# Patient Record
Sex: MALE | Race: WHITE | NOT HISPANIC OR LATINO | Employment: FULL TIME | URBAN - METROPOLITAN AREA
[De-identification: names, ages, dates, MRNs, and addresses within clinical notes are randomized per-mention and may not be internally consistent; named-entity substitution may affect disease eponyms.]

---

## 2023-04-11 DIAGNOSIS — E78.2 MIXED HYPERLIPIDEMIA: ICD-10-CM

## 2023-04-13 RX ORDER — ATORVASTATIN CALCIUM 40 MG/1
TABLET, FILM COATED ORAL
Qty: 90 TABLET | Refills: 1 | Status: SHIPPED | OUTPATIENT
Start: 2023-04-13 | End: 2023-10-05

## 2023-08-05 DIAGNOSIS — I10 ESSENTIAL (PRIMARY) HYPERTENSION: ICD-10-CM

## 2023-08-11 RX ORDER — LOSARTAN POTASSIUM 100 MG/1
100 TABLET ORAL DAILY
Qty: 90 TABLET | Refills: 1 | Status: SHIPPED | OUTPATIENT
Start: 2023-08-11 | End: 2024-02-06 | Stop reason: SDUPTHER

## 2023-10-05 DIAGNOSIS — E78.2 MIXED HYPERLIPIDEMIA: ICD-10-CM

## 2023-10-05 RX ORDER — ATORVASTATIN CALCIUM 40 MG/1
TABLET, FILM COATED ORAL
Qty: 90 TABLET | Refills: 1 | Status: SHIPPED | OUTPATIENT
Start: 2023-10-05 | End: 2024-03-26

## 2023-11-13 ASSESSMENT — PROMIS GLOBAL HEALTH SCALE
RATE_SOCIAL_SATISFACTION: VERY GOOD
CARRYOUT_SOCIAL_ACTIVITIES: VERY GOOD
RATE_PHYSICAL_HEALTH: VERY GOOD
RATE_MENTAL_HEALTH: VERY GOOD
RATE_GENERAL_HEALTH: VERY GOOD
RATE_AVERAGE_PAIN: 1
EMOTIONAL_PROBLEMS: RARELY
RATE_AVERAGE_FATIGUE: MILD
CARRYOUT_PHYSICAL_ACTIVITIES: COMPLETELY
RATE_QUALITY_OF_LIFE: VERY GOOD

## 2023-11-17 ENCOUNTER — OFFICE VISIT (OUTPATIENT)
Dept: PRIMARY CARE | Facility: CLINIC | Age: 58
End: 2023-11-17
Payer: COMMERCIAL

## 2023-11-17 ENCOUNTER — LAB (OUTPATIENT)
Dept: LAB | Facility: LAB | Age: 58
End: 2023-11-17
Payer: COMMERCIAL

## 2023-11-17 VITALS
TEMPERATURE: 96.8 F | DIASTOLIC BLOOD PRESSURE: 74 MMHG | HEART RATE: 52 BPM | WEIGHT: 191.6 LBS | BODY MASS INDEX: 26.82 KG/M2 | HEIGHT: 71 IN | SYSTOLIC BLOOD PRESSURE: 120 MMHG

## 2023-11-17 DIAGNOSIS — I10 BENIGN ESSENTIAL HYPERTENSION: ICD-10-CM

## 2023-11-17 DIAGNOSIS — Z00.00 HEALTH CARE MAINTENANCE: ICD-10-CM

## 2023-11-17 DIAGNOSIS — E78.2 MIXED HYPERLIPIDEMIA: Primary | ICD-10-CM

## 2023-11-17 PROBLEM — N52.9 INABILITY TO ATTAIN ERECTION: Status: ACTIVE | Noted: 2023-11-17

## 2023-11-17 LAB
25(OH)D3 SERPL-MCNC: 40 NG/ML (ref 30–100)
ALBUMIN SERPL BCP-MCNC: 4.5 G/DL (ref 3.4–5)
ALP SERPL-CCNC: 63 U/L (ref 33–120)
ALT SERPL W P-5'-P-CCNC: 16 U/L (ref 10–52)
ANION GAP SERPL CALC-SCNC: 13 MMOL/L (ref 10–20)
APPEARANCE UR: CLEAR
AST SERPL W P-5'-P-CCNC: 19 U/L (ref 9–39)
BASOPHILS # BLD AUTO: 0.03 X10*3/UL (ref 0–0.1)
BASOPHILS NFR BLD AUTO: 0.5 %
BILIRUB SERPL-MCNC: 2.1 MG/DL (ref 0–1.2)
BILIRUB UR STRIP.AUTO-MCNC: NEGATIVE MG/DL
BUN SERPL-MCNC: 19 MG/DL (ref 6–23)
CALCIUM SERPL-MCNC: 9.7 MG/DL (ref 8.6–10.6)
CHLORIDE SERPL-SCNC: 100 MMOL/L (ref 98–107)
CHOLEST SERPL-MCNC: 132 MG/DL (ref 0–199)
CHOLESTEROL/HDL RATIO: 3.2
CO2 SERPL-SCNC: 29 MMOL/L (ref 21–32)
COLOR UR: NORMAL
CREAT SERPL-MCNC: 1.17 MG/DL (ref 0.5–1.3)
CREAT UR-MCNC: 43 MG/DL (ref 20–370)
EOSINOPHIL # BLD AUTO: 0.31 X10*3/UL (ref 0–0.7)
EOSINOPHIL NFR BLD AUTO: 5.5 %
ERYTHROCYTE [DISTWIDTH] IN BLOOD BY AUTOMATED COUNT: 12.2 % (ref 11.5–14.5)
GFR SERPL CREATININE-BSD FRML MDRD: 73 ML/MIN/1.73M*2
GLUCOSE SERPL-MCNC: 91 MG/DL (ref 74–99)
GLUCOSE UR STRIP.AUTO-MCNC: NEGATIVE MG/DL
HCT VFR BLD AUTO: 42.9 % (ref 41–52)
HDLC SERPL-MCNC: 40.7 MG/DL
HGB BLD-MCNC: 14.3 G/DL (ref 13.5–17.5)
IMM GRANULOCYTES # BLD AUTO: 0.02 X10*3/UL (ref 0–0.7)
IMM GRANULOCYTES NFR BLD AUTO: 0.4 % (ref 0–0.9)
KETONES UR STRIP.AUTO-MCNC: NEGATIVE MG/DL
LDLC SERPL CALC-MCNC: 70 MG/DL
LEUKOCYTE ESTERASE UR QL STRIP.AUTO: NEGATIVE
LYMPHOCYTES # BLD AUTO: 1.27 X10*3/UL (ref 1.2–4.8)
LYMPHOCYTES NFR BLD AUTO: 22.5 %
MCH RBC QN AUTO: 29.4 PG (ref 26–34)
MCHC RBC AUTO-ENTMCNC: 33.3 G/DL (ref 32–36)
MCV RBC AUTO: 88 FL (ref 80–100)
MICROALBUMIN UR-MCNC: <7 MG/L
MICROALBUMIN/CREAT UR: NORMAL MG/G{CREAT}
MONOCYTES # BLD AUTO: 0.47 X10*3/UL (ref 0.1–1)
MONOCYTES NFR BLD AUTO: 8.3 %
NEUTROPHILS # BLD AUTO: 3.55 X10*3/UL (ref 1.2–7.7)
NEUTROPHILS NFR BLD AUTO: 62.8 %
NITRITE UR QL STRIP.AUTO: NEGATIVE
NON HDL CHOLESTEROL: 91 MG/DL (ref 0–149)
NRBC BLD-RTO: 0 /100 WBCS (ref 0–0)
PH UR STRIP.AUTO: 7 [PH]
PLATELET # BLD AUTO: 159 X10*3/UL (ref 150–450)
POTASSIUM SERPL-SCNC: 3.8 MMOL/L (ref 3.5–5.3)
PROT SERPL-MCNC: 6.5 G/DL (ref 6.4–8.2)
PROT UR STRIP.AUTO-MCNC: NEGATIVE MG/DL
PSA SERPL-MCNC: 2.31 NG/ML
RBC # BLD AUTO: 4.87 X10*6/UL (ref 4.5–5.9)
RBC # UR STRIP.AUTO: NEGATIVE /UL
SODIUM SERPL-SCNC: 138 MMOL/L (ref 136–145)
SP GR UR STRIP.AUTO: 1.01
TRIGL SERPL-MCNC: 105 MG/DL (ref 0–149)
TSH SERPL-ACNC: 3.76 MIU/L (ref 0.44–3.98)
URATE SERPL-MCNC: 6.1 MG/DL (ref 4–7.5)
UROBILINOGEN UR STRIP.AUTO-MCNC: <2 MG/DL
VLDL: 21 MG/DL (ref 0–40)
WBC # BLD AUTO: 5.7 X10*3/UL (ref 4.4–11.3)

## 2023-11-17 PROCEDURE — 84443 ASSAY THYROID STIM HORMONE: CPT

## 2023-11-17 PROCEDURE — 80061 LIPID PANEL: CPT

## 2023-11-17 PROCEDURE — 93000 ELECTROCARDIOGRAM COMPLETE: CPT | Performed by: INTERNAL MEDICINE

## 2023-11-17 PROCEDURE — 3078F DIAST BP <80 MM HG: CPT | Performed by: INTERNAL MEDICINE

## 2023-11-17 PROCEDURE — 3074F SYST BP LT 130 MM HG: CPT | Performed by: INTERNAL MEDICINE

## 2023-11-17 PROCEDURE — 80053 COMPREHEN METABOLIC PANEL: CPT

## 2023-11-17 PROCEDURE — 82570 ASSAY OF URINE CREATININE: CPT

## 2023-11-17 PROCEDURE — 82043 UR ALBUMIN QUANTITATIVE: CPT

## 2023-11-17 PROCEDURE — 1036F TOBACCO NON-USER: CPT | Performed by: INTERNAL MEDICINE

## 2023-11-17 PROCEDURE — 36415 COLL VENOUS BLD VENIPUNCTURE: CPT

## 2023-11-17 PROCEDURE — 99396 PREV VISIT EST AGE 40-64: CPT | Performed by: INTERNAL MEDICINE

## 2023-11-17 PROCEDURE — 84550 ASSAY OF BLOOD/URIC ACID: CPT

## 2023-11-17 PROCEDURE — 85025 COMPLETE CBC W/AUTO DIFF WBC: CPT

## 2023-11-17 PROCEDURE — 81003 URINALYSIS AUTO W/O SCOPE: CPT

## 2023-11-17 PROCEDURE — 82306 VITAMIN D 25 HYDROXY: CPT

## 2023-11-17 PROCEDURE — 84153 ASSAY OF PSA TOTAL: CPT

## 2023-11-17 RX ORDER — HYDROCHLOROTHIAZIDE 12.5 MG/1
12.5 TABLET ORAL DAILY
COMMUNITY
End: 2024-02-02

## 2023-11-17 SDOH — HEALTH STABILITY: PHYSICAL HEALTH: ON AVERAGE, HOW MANY MINUTES DO YOU ENGAGE IN EXERCISE AT THIS LEVEL?: 30 MIN

## 2023-11-17 SDOH — HEALTH STABILITY: PHYSICAL HEALTH: ON AVERAGE, HOW MANY DAYS PER WEEK DO YOU ENGAGE IN MODERATE TO STRENUOUS EXERCISE (LIKE A BRISK WALK)?: 6 DAYS

## 2023-11-17 ASSESSMENT — ENCOUNTER SYMPTOMS
EYE DISCHARGE: 0
SPEECH DIFFICULTY: 0
HEMATURIA: 0
ACTIVITY CHANGE: 0
ANAL BLEEDING: 0
DIFFICULTY URINATING: 0
DYSURIA: 0
LIGHT-HEADEDNESS: 0
SINUS PAIN: 0
COLOR CHANGE: 0
FLANK PAIN: 0
RHINORRHEA: 0
NECK STIFFNESS: 0
DIZZINESS: 0
PALPITATIONS: 0
DIARRHEA: 0
FATIGUE: 0
CHEST TIGHTNESS: 0
NUMBNESS: 0
WOUND: 0
POLYDIPSIA: 0
STRIDOR: 0
SHORTNESS OF BREATH: 0
BACK PAIN: 1
CHOKING: 0
JOINT SWELLING: 0
POLYPHAGIA: 0
ADENOPATHY: 0
PHOTOPHOBIA: 0
BRUISES/BLEEDS EASILY: 0
ABDOMINAL PAIN: 0
SEIZURES: 0
CHILLS: 0
EYE PAIN: 0
ARTHRALGIAS: 0
MYALGIAS: 0
VOICE CHANGE: 0
SINUS PRESSURE: 0
COUGH: 0
SORE THROAT: 0
BLOOD IN STOOL: 0
WEAKNESS: 0
NAUSEA: 0
HEADACHES: 0
WHEEZING: 0
CONSTIPATION: 0
EYE ITCHING: 0
ABDOMINAL DISTENTION: 0
VOMITING: 0
RECTAL PAIN: 0
SLEEP DISTURBANCE: 0
TREMORS: 0
FREQUENCY: 0
NECK PAIN: 0
FACIAL ASYMMETRY: 0
EYE REDNESS: 0
DIAPHORESIS: 0
TROUBLE SWALLOWING: 0
APPETITE CHANGE: 0
FACIAL SWELLING: 0

## 2023-11-17 ASSESSMENT — LIFESTYLE VARIABLES
HOW MANY STANDARD DRINKS CONTAINING ALCOHOL DO YOU HAVE ON A TYPICAL DAY: 1 OR 2
AUDIT-C TOTAL SCORE: 4
SKIP TO QUESTIONS 9-10: 1
HOW OFTEN DO YOU HAVE A DRINK CONTAINING ALCOHOL: 4 OR MORE TIMES A WEEK
HOW OFTEN DO YOU HAVE SIX OR MORE DRINKS ON ONE OCCASION: NEVER

## 2023-11-17 ASSESSMENT — PATIENT HEALTH QUESTIONNAIRE - PHQ9
2. FEELING DOWN, DEPRESSED OR HOPELESS: NOT AT ALL
1. LITTLE INTEREST OR PLEASURE IN DOING THINGS: NOT AT ALL
SUM OF ALL RESPONSES TO PHQ9 QUESTIONS 1 & 2: 0

## 2023-11-17 NOTE — PATIENT INSTRUCTIONS
Please take medication as prescribed.  Diet and exercise.  Follow-up in 6 months..  Schedule your cardiac score

## 2023-11-17 NOTE — PROGRESS NOTES
Subjective   Patient ID: Saul Ramirez is a 57 y.o. male who presents for Annual Exam (Pt present today for physical. ls).    Patient presents for physical exam.  He has been compliant with his medications, diet and exercise.  He overall feels well.  He denies any headaches, no dizziness, no sinus problems, no chest pain or shortness of breath.  Denies abdominal pain no nausea vomiting or diarrhea.  He reports no new musculoskeletal complaints.  Does report baseline occasional back pain.         Review of Systems   Constitutional:  Negative for activity change, appetite change, chills, diaphoresis and fatigue.   HENT:  Negative for congestion, dental problem, drooling, ear discharge, ear pain, facial swelling, hearing loss, mouth sores, nosebleeds, postnasal drip, rhinorrhea, sinus pressure, sinus pain, sneezing, sore throat, tinnitus, trouble swallowing and voice change.    Eyes:  Negative for photophobia, pain, discharge, redness, itching and visual disturbance.   Respiratory:  Negative for cough, choking, chest tightness, shortness of breath, wheezing and stridor.    Cardiovascular:  Negative for chest pain, palpitations and leg swelling.   Gastrointestinal:  Negative for abdominal distention, abdominal pain, anal bleeding, blood in stool, constipation, diarrhea, nausea, rectal pain and vomiting.   Endocrine: Negative for cold intolerance, heat intolerance, polydipsia, polyphagia and polyuria.   Genitourinary:  Negative for decreased urine volume, difficulty urinating, dysuria, enuresis, flank pain, frequency, genital sores, hematuria and urgency.   Musculoskeletal:  Positive for back pain. Negative for arthralgias, gait problem, joint swelling, myalgias, neck pain and neck stiffness.   Skin:  Negative for color change, pallor, rash and wound.   Neurological:  Negative for dizziness, tremors, seizures, syncope, facial asymmetry, speech difficulty, weakness, light-headedness, numbness and headaches.  "  Hematological:  Negative for adenopathy. Does not bruise/bleed easily.   Psychiatric/Behavioral:  Negative for sleep disturbance.        Objective   /74   Pulse 52   Temp 36 °C (96.8 °F)   Ht 1.803 m (5' 11\")   Wt 86.9 kg (191 lb 9.6 oz)   BMI 26.72 kg/m²     Physical Exam  Constitutional:       General: He is not in acute distress.     Appearance: Normal appearance. He is normal weight. He is not ill-appearing, toxic-appearing or diaphoretic.   HENT:      Head: Normocephalic and atraumatic.      Right Ear: Tympanic membrane, ear canal and external ear normal. There is no impacted cerumen.      Left Ear: Tympanic membrane, ear canal and external ear normal. There is no impacted cerumen.      Nose: Nose normal. No congestion or rhinorrhea.      Mouth/Throat:      Mouth: Mucous membranes are moist.      Pharynx: Oropharynx is clear. No oropharyngeal exudate or posterior oropharyngeal erythema.   Eyes:      General: No scleral icterus.        Right eye: No discharge.         Left eye: No discharge.      Extraocular Movements: Extraocular movements intact.      Conjunctiva/sclera: Conjunctivae normal.      Pupils: Pupils are equal, round, and reactive to light.   Neck:      Vascular: No carotid bruit.   Cardiovascular:      Rate and Rhythm: Regular rhythm. Bradycardia present.      Pulses: Normal pulses.      Heart sounds: Normal heart sounds. No murmur heard.     No friction rub. No gallop.   Pulmonary:      Effort: No respiratory distress.      Breath sounds: No stridor. No wheezing, rhonchi or rales.   Chest:      Chest wall: No tenderness.   Abdominal:      General: Abdomen is flat. There is no distension.      Palpations: Abdomen is soft. There is no mass.      Tenderness: There is no abdominal tenderness. There is no right CVA tenderness, left CVA tenderness or guarding.      Hernia: No hernia is present.   Genitourinary:     Penis: Normal.       Testes: Normal.      Rectum: Normal.      Comments: " Smooth mildly enlarged prostate  Musculoskeletal:         General: No swelling, tenderness, deformity or signs of injury. Normal range of motion.      Cervical back: Normal range of motion and neck supple. No rigidity or tenderness.      Right lower leg: No edema.      Left lower leg: No edema.   Lymphadenopathy:      Cervical: No cervical adenopathy.   Skin:     General: Skin is warm and dry.      Coloration: Skin is not jaundiced or pale.      Findings: No bruising, erythema, lesion or rash.   Neurological:      General: No focal deficit present.      Mental Status: He is alert and oriented to person, place, and time. Mental status is at baseline.      Cranial Nerves: No cranial nerve deficit.      Sensory: No sensory deficit.      Motor: No weakness.      Coordination: Coordination normal.      Gait: Gait normal.      Deep Tendon Reflexes: Reflexes normal.   Psychiatric:         Mood and Affect: Mood normal.         Behavior: Behavior normal.         Thought Content: Thought content normal.         Judgment: Judgment normal.         Assessment/Plan   Diagnoses and all orders for this visit:  Mixed hyperlipidemia-we will check a fasting lipid profile.  -     CT cardiac scoring wo IV contrast; Future  Health care maintenance-colonoscopy has been done.  Immunizations are up-to-date.  Dermatology has been done.  Eye and dental have been done.  -     Albumin , Urine Random; Future  -     Vitamin D 25-Hydroxy,Total (for eval of Vitamin D levels); Future  -     CBC and Auto Differential; Future  -     Comprehensive Metabolic Panel; Future  -     Prostate Specific Antigen; Future  -     TSH with reflex to Free T4 if abnormal; Future  -     Uric Acid; Future  -     Urinalysis with Reflex Microscopic; Future  -     Lipid Panel; Future  -     ECG 12 Lead  Benign essential hypertension-low-salt diet and exercise.  Elevated calcium score-we will recheck cardiac score.  Modify risk factor

## 2023-11-18 DIAGNOSIS — R17 HIGH BILIRUBIN: ICD-10-CM

## 2023-11-18 DIAGNOSIS — Z00.00 HEALTH CARE MAINTENANCE: Primary | ICD-10-CM

## 2023-11-29 ENCOUNTER — ANCILLARY PROCEDURE (OUTPATIENT)
Dept: RADIOLOGY | Facility: CLINIC | Age: 58
End: 2023-11-29
Payer: COMMERCIAL

## 2023-11-29 DIAGNOSIS — R17 HIGH BILIRUBIN: ICD-10-CM

## 2023-11-29 PROCEDURE — 76705 ECHO EXAM OF ABDOMEN: CPT

## 2023-11-29 PROCEDURE — 76705 ECHO EXAM OF ABDOMEN: CPT | Performed by: STUDENT IN AN ORGANIZED HEALTH CARE EDUCATION/TRAINING PROGRAM

## 2023-12-07 DIAGNOSIS — F52.21 MALE ERECTILE DISORDER (CODE): ICD-10-CM

## 2023-12-07 RX ORDER — SILDENAFIL 100 MG/1
TABLET, FILM COATED ORAL
Qty: 6 TABLET | Refills: 11 | Status: SHIPPED | OUTPATIENT
Start: 2023-12-07

## 2024-02-01 DIAGNOSIS — I10 ESSENTIAL (PRIMARY) HYPERTENSION: ICD-10-CM

## 2024-02-02 RX ORDER — HYDROCHLOROTHIAZIDE 12.5 MG/1
12.5 TABLET ORAL DAILY
Qty: 90 TABLET | Refills: 1 | Status: SHIPPED | OUTPATIENT
Start: 2024-02-02

## 2024-02-05 DIAGNOSIS — I10 ESSENTIAL (PRIMARY) HYPERTENSION: ICD-10-CM

## 2024-02-06 RX ORDER — LOSARTAN POTASSIUM 100 MG/1
100 TABLET ORAL DAILY
Qty: 90 TABLET | Refills: 1 | Status: SHIPPED | OUTPATIENT
Start: 2024-02-06

## 2024-02-15 ENCOUNTER — LAB (OUTPATIENT)
Dept: LAB | Facility: LAB | Age: 59
End: 2024-02-15
Payer: COMMERCIAL

## 2024-02-15 DIAGNOSIS — Z00.00 HEALTH CARE MAINTENANCE: Primary | ICD-10-CM

## 2024-02-15 DIAGNOSIS — Z00.00 HEALTH CARE MAINTENANCE: ICD-10-CM

## 2024-02-15 LAB — PSA SERPL-MCNC: 2.61 NG/ML

## 2024-02-15 PROCEDURE — 84153 ASSAY OF PSA TOTAL: CPT

## 2024-02-15 PROCEDURE — 36415 COLL VENOUS BLD VENIPUNCTURE: CPT

## 2024-02-27 ENCOUNTER — HOSPITAL ENCOUNTER (OUTPATIENT)
Dept: RADIOLOGY | Facility: CLINIC | Age: 59
Discharge: HOME | End: 2024-02-27
Payer: COMMERCIAL

## 2024-02-27 DIAGNOSIS — E78.2 MIXED HYPERLIPIDEMIA: ICD-10-CM

## 2024-02-27 DIAGNOSIS — R93.1 AGATSTON CORONARY ARTERY CALCIUM SCORE GREATER THAN 400: Primary | ICD-10-CM

## 2024-02-27 PROCEDURE — 75571 CT HRT W/O DYE W/CA TEST: CPT

## 2024-03-15 ENCOUNTER — HOSPITAL ENCOUNTER (OUTPATIENT)
Dept: CARDIOLOGY | Facility: HOSPITAL | Age: 59
Discharge: HOME | End: 2024-03-15
Payer: COMMERCIAL

## 2024-03-15 DIAGNOSIS — R93.1 AGATSTON CORONARY ARTERY CALCIUM SCORE GREATER THAN 400: ICD-10-CM

## 2024-03-15 PROCEDURE — 93325 DOPPLER ECHO COLOR FLOW MAPG: CPT | Performed by: INTERNAL MEDICINE

## 2024-03-15 PROCEDURE — 93325 DOPPLER ECHO COLOR FLOW MAPG: CPT

## 2024-03-15 PROCEDURE — 93350 STRESS TTE ONLY: CPT | Performed by: INTERNAL MEDICINE

## 2024-03-15 PROCEDURE — 93321 DOPPLER ECHO F-UP/LMTD STD: CPT | Performed by: INTERNAL MEDICINE

## 2024-03-15 PROCEDURE — 93016 CV STRESS TEST SUPVJ ONLY: CPT | Performed by: INTERNAL MEDICINE

## 2024-03-15 PROCEDURE — 93018 CV STRESS TEST I&R ONLY: CPT | Performed by: INTERNAL MEDICINE

## 2024-03-26 DIAGNOSIS — E78.2 MIXED HYPERLIPIDEMIA: ICD-10-CM

## 2024-03-26 RX ORDER — ATORVASTATIN CALCIUM 40 MG/1
TABLET, FILM COATED ORAL
Qty: 90 TABLET | Refills: 1 | Status: SHIPPED | OUTPATIENT
Start: 2024-03-26

## 2024-03-29 PROBLEM — R97.20 PSA ELEVATION: Status: ACTIVE | Noted: 2024-03-29

## 2024-03-29 PROBLEM — Z85.828 HISTORY OF SQUAMOUS CELL CARCINOMA OF SKIN: Status: ACTIVE | Noted: 2024-03-29

## 2024-04-01 ENCOUNTER — OFFICE VISIT (OUTPATIENT)
Dept: CARDIOLOGY | Facility: CLINIC | Age: 59
End: 2024-04-01
Payer: COMMERCIAL

## 2024-04-01 VITALS
HEIGHT: 71 IN | BODY MASS INDEX: 26.95 KG/M2 | SYSTOLIC BLOOD PRESSURE: 101 MMHG | WEIGHT: 192.5 LBS | OXYGEN SATURATION: 98 % | HEART RATE: 68 BPM | DIASTOLIC BLOOD PRESSURE: 65 MMHG

## 2024-04-01 DIAGNOSIS — R93.1 AGATSTON CORONARY ARTERY CALCIUM SCORE GREATER THAN 400: ICD-10-CM

## 2024-04-01 DIAGNOSIS — E78.49 OTHER HYPERLIPIDEMIA: ICD-10-CM

## 2024-04-01 DIAGNOSIS — I25.84 CORONARY ARTERY CALCIFICATION: Primary | ICD-10-CM

## 2024-04-01 DIAGNOSIS — I25.10 CORONARY ARTERY CALCIFICATION: Primary | ICD-10-CM

## 2024-04-01 DIAGNOSIS — I10 BENIGN ESSENTIAL HYPERTENSION: ICD-10-CM

## 2024-04-01 PROCEDURE — 99203 OFFICE O/P NEW LOW 30 MIN: CPT | Performed by: INTERNAL MEDICINE

## 2024-04-01 PROCEDURE — 99213 OFFICE O/P EST LOW 20 MIN: CPT | Performed by: INTERNAL MEDICINE

## 2024-04-01 PROCEDURE — 1036F TOBACCO NON-USER: CPT | Performed by: INTERNAL MEDICINE

## 2024-04-01 PROCEDURE — 3078F DIAST BP <80 MM HG: CPT | Performed by: INTERNAL MEDICINE

## 2024-04-01 PROCEDURE — 3074F SYST BP LT 130 MM HG: CPT | Performed by: INTERNAL MEDICINE

## 2024-04-01 RX ORDER — ASPIRIN 81 MG/1
81 TABLET ORAL DAILY
COMMUNITY

## 2024-04-01 ASSESSMENT — ENCOUNTER SYMPTOMS
ALTERED MENTAL STATUS: 0
COUGH: 0
FALLS: 0
DYSURIA: 0
CHILLS: 0
HEADACHES: 0
FEVER: 0
LOSS OF SENSATION IN FEET: 0
CONSTIPATION: 0
NAUSEA: 0
ABDOMINAL PAIN: 0
MYALGIAS: 0
VOMITING: 0
BLOATING: 0
OCCASIONAL FEELINGS OF UNSTEADINESS: 0
WHEEZING: 0
HEMATURIA: 0
DEPRESSION: 0
DIARRHEA: 0
MEMORY LOSS: 0
HEMOPTYSIS: 0

## 2024-04-01 ASSESSMENT — PATIENT HEALTH QUESTIONNAIRE - PHQ9
1. LITTLE INTEREST OR PLEASURE IN DOING THINGS: NOT AT ALL
2. FEELING DOWN, DEPRESSED OR HOPELESS: NOT AT ALL
SUM OF ALL RESPONSES TO PHQ9 QUESTIONS 1 AND 2: 0

## 2024-04-01 ASSESSMENT — COLUMBIA-SUICIDE SEVERITY RATING SCALE - C-SSRS
6. HAVE YOU EVER DONE ANYTHING, STARTED TO DO ANYTHING, OR PREPARED TO DO ANYTHING TO END YOUR LIFE?: NO
2. HAVE YOU ACTUALLY HAD ANY THOUGHTS OF KILLING YOURSELF?: NO
1. IN THE PAST MONTH, HAVE YOU WISHED YOU WERE DEAD OR WISHED YOU COULD GO TO SLEEP AND NOT WAKE UP?: NO

## 2024-04-01 ASSESSMENT — PAIN SCALES - GENERAL: PAINLEVEL: 0-NO PAIN

## 2024-04-01 NOTE — PROGRESS NOTES
Chief complaint:  CAC  (Consult requested by ARIEL So)     HPI  57 yo WM w/ h/o CAC, HTN, HLD, ED now here for cardiology consult. No chest pain. No dyspnea at rest. No HENNING. No orthopnea/PND. No palps. No LH/dizzy/syncope. No edema. No claudication. No cough.   He walks, runs and lifts weights with no symptoms.  BP at home (occ checked): ~120/80  ECG 11/23: SB (48)  ECG 3/24: SR (60)  DAFNE 3/24: 1.5mm horiz ST dep, no echo ischemia, EF 65% -> 75%, no DD, no pHTN  CCS 2/18: 93 (LM 0, LAD 49, LCX 4, RCA 40), nl heart size, no peric eff  CCS 2/24: 528 (LM 18, , LCX 43, ), nl heart size, no peric eff, AsAo 3.6cm    Review of Systems   Constitutional: Negative for chills, fever and malaise/fatigue.   HENT:  Negative for hearing loss.    Eyes:  Negative for visual disturbance.   Respiratory:  Negative for cough, hemoptysis and wheezing.    Skin:  Negative for rash.   Musculoskeletal:  Negative for falls and myalgias.   Gastrointestinal:  Negative for bloating, abdominal pain, constipation, diarrhea, dysphagia, nausea and vomiting.   Genitourinary:  Negative for dysuria and hematuria.   Neurological:  Negative for headaches.   Psychiatric/Behavioral:  Negative for altered mental status, depression and memory loss.       Social History     Tobacco Use    Smoking status: Never    Smokeless tobacco: Never   Substance Use Topics    Alcohol use: Yes     Alcohol/week: 8.0 standard drinks of alcohol     Types: 2 Glasses of wine, 6 Cans of beer per week      Family History   Problem Relation Name Age of Onset    Pancreatic cancer Mother      Coronary artery disease Father        Allergies   Allergen Reactions    Ace Inhibitors Cough    Amoxicillin Rash      Current Outpatient Medications   Medication Instructions    aspirin 81 mg, oral, Daily    atorvastatin (Lipitor) 40 mg tablet TAKE 1 TABLET BY MOUTH EVERY DAY    hydroCHLOROthiazide (MICROZIDE) 12.5 mg, oral, Daily    losartan (COZAAR) 100 mg, oral, Daily     sildenafil (Viagra) 100 mg tablet TAKE 1 TABLET DAILY AS NEEDED APPROXIMATELY 1 HOUR BEFORE SEXUAL ACTIVITY      Vitals:    04/01/24 0914   BP: 101/65   Pulse:    SpO2:       Physical Exam  Constitutional:       Appearance: Normal appearance.   HENT:      Head: Normocephalic and atraumatic.      Nose: Nose normal.   Neck:      Vascular: No carotid bruit.   Cardiovascular:      Rate and Rhythm: Normal rate and regular rhythm.      Heart sounds: No murmur heard.  Pulmonary:      Effort: Pulmonary effort is normal.      Breath sounds: Normal breath sounds.   Abdominal:      Palpations: Abdomen is soft.      Tenderness: There is no abdominal tenderness.   Musculoskeletal:      Right lower leg: No edema.      Left lower leg: No edema.   Skin:     General: Skin is warm and dry.   Neurological:      General: No focal deficit present.      Mental Status: He is alert.   Psychiatric:         Mood and Affect: Mood normal.         Judgment: Judgment normal.        Lab Results   Component Value Date    CR 1.17  11/23    HGB 14.3  11/23    K 3.8  11/23    MG      BNP        11/23    LDL 70 11/23    TSH 3.76 11/23     Assessment/Plan   59 yo WM w/ h/o CAC, HTN, HLD, ED. Doing well. No cardiac symptoms including with exercise. DAFNE with borderline ECG ischemia; however, no echo ischemia. Without symptoms, defer further testing. Continue med mgt. If symptoms, then can consider CTA vs cath.  -continue ASA 81 every day  -continue Losartan 100 every day  -try holding Hydrochlorothiazide 12.5 every other day (BP 100s/60s today and did not take hydrochlorothiazide this AM) -> if BP at home >130/80, resume at 6.25 every day  -continue Atorva 40 every day (consider increase to 80) -> goal LDL <70  -FU PRN    Ward Swartz, MD

## 2024-04-01 NOTE — PATIENT INSTRUCTIONS
Try stopping Hydrochlorothiazide  If BP remains <130/80, can stay off it  If BP >130/80, resume it at 1/2 tablet daily

## 2024-06-22 DIAGNOSIS — E78.2 MIXED HYPERLIPIDEMIA: ICD-10-CM

## 2024-06-24 RX ORDER — ATORVASTATIN CALCIUM 40 MG/1
TABLET, FILM COATED ORAL
Qty: 90 TABLET | Refills: 1 | Status: SHIPPED | OUTPATIENT
Start: 2024-06-24

## 2024-07-14 ENCOUNTER — HOSPITAL ENCOUNTER (INPATIENT)
Facility: HOSPITAL | Age: 59
LOS: 4 days | Discharge: HOME | DRG: 827 | End: 2024-07-19
Attending: EMERGENCY MEDICINE | Admitting: SURGERY
Payer: COMMERCIAL

## 2024-07-14 ENCOUNTER — CLINICAL SUPPORT (OUTPATIENT)
Dept: EMERGENCY MEDICINE | Facility: HOSPITAL | Age: 59
DRG: 827 | End: 2024-07-14
Payer: COMMERCIAL

## 2024-07-14 DIAGNOSIS — K63.89 SMALL BOWEL MASS: ICD-10-CM

## 2024-07-14 DIAGNOSIS — K56.609 SMALL BOWEL OBSTRUCTION (MULTI): Primary | ICD-10-CM

## 2024-07-14 LAB
ALBUMIN SERPL BCP-MCNC: 4.8 G/DL (ref 3.4–5)
ALP SERPL-CCNC: 75 U/L (ref 33–120)
ALT SERPL W P-5'-P-CCNC: 15 U/L (ref 10–52)
ANION GAP SERPL CALC-SCNC: 15 MMOL/L (ref 10–20)
APPEARANCE UR: CLEAR
AST SERPL W P-5'-P-CCNC: 19 U/L (ref 9–39)
BASOPHILS # BLD AUTO: 0.03 X10*3/UL (ref 0–0.1)
BASOPHILS NFR BLD AUTO: 0.3 %
BILIRUB SERPL-MCNC: 2.1 MG/DL (ref 0–1.2)
BILIRUB UR STRIP.AUTO-MCNC: NEGATIVE MG/DL
BUN SERPL-MCNC: 16 MG/DL (ref 6–23)
CALCIUM SERPL-MCNC: 10.1 MG/DL (ref 8.6–10.6)
CARDIAC TROPONIN I PNL SERPL HS: 7 NG/L (ref 0–53)
CHLORIDE SERPL-SCNC: 99 MMOL/L (ref 98–107)
CO2 SERPL-SCNC: 31 MMOL/L (ref 21–32)
COLOR UR: YELLOW
CREAT SERPL-MCNC: 1.15 MG/DL (ref 0.5–1.3)
EGFRCR SERPLBLD CKD-EPI 2021: 74 ML/MIN/1.73M*2
EOSINOPHIL # BLD AUTO: 0.06 X10*3/UL (ref 0–0.7)
EOSINOPHIL NFR BLD AUTO: 0.7 %
ERYTHROCYTE [DISTWIDTH] IN BLOOD BY AUTOMATED COUNT: 12 % (ref 11.5–14.5)
GLUCOSE SERPL-MCNC: 99 MG/DL (ref 74–99)
GLUCOSE UR STRIP.AUTO-MCNC: NORMAL MG/DL
HCT VFR BLD AUTO: 43.2 % (ref 41–52)
HGB BLD-MCNC: 15.3 G/DL (ref 13.5–17.5)
IMM GRANULOCYTES # BLD AUTO: 0.04 X10*3/UL (ref 0–0.7)
IMM GRANULOCYTES NFR BLD AUTO: 0.4 % (ref 0–0.9)
KETONES UR STRIP.AUTO-MCNC: ABNORMAL MG/DL
LEUKOCYTE ESTERASE UR QL STRIP.AUTO: NEGATIVE
LIPASE SERPL-CCNC: 66 U/L (ref 9–82)
LYMPHOCYTES # BLD AUTO: 1.13 X10*3/UL (ref 1.2–4.8)
LYMPHOCYTES NFR BLD AUTO: 12.4 %
MAGNESIUM SERPL-MCNC: 2.24 MG/DL (ref 1.6–2.4)
MCH RBC QN AUTO: 29.6 PG (ref 26–34)
MCHC RBC AUTO-ENTMCNC: 35.4 G/DL (ref 32–36)
MCV RBC AUTO: 84 FL (ref 80–100)
MONOCYTES # BLD AUTO: 0.76 X10*3/UL (ref 0.1–1)
MONOCYTES NFR BLD AUTO: 8.3 %
NEUTROPHILS # BLD AUTO: 7.12 X10*3/UL (ref 1.2–7.7)
NEUTROPHILS NFR BLD AUTO: 77.9 %
NITRITE UR QL STRIP.AUTO: NEGATIVE
NRBC BLD-RTO: 0 /100 WBCS (ref 0–0)
PH UR STRIP.AUTO: 6 [PH]
PLATELET # BLD AUTO: 171 X10*3/UL (ref 150–450)
POTASSIUM SERPL-SCNC: 3.6 MMOL/L (ref 3.5–5.3)
PROT SERPL-MCNC: 7.4 G/DL (ref 6.4–8.2)
PROT UR STRIP.AUTO-MCNC: ABNORMAL MG/DL
RBC # BLD AUTO: 5.17 X10*6/UL (ref 4.5–5.9)
RBC # UR STRIP.AUTO: NEGATIVE /UL
RBC #/AREA URNS AUTO: NORMAL /HPF
SODIUM SERPL-SCNC: 141 MMOL/L (ref 136–145)
SP GR UR STRIP.AUTO: 1.03
UROBILINOGEN UR STRIP.AUTO-MCNC: NORMAL MG/DL
WBC # BLD AUTO: 9.1 X10*3/UL (ref 4.4–11.3)
WBC #/AREA URNS AUTO: NORMAL /HPF

## 2024-07-14 PROCEDURE — 96375 TX/PRO/DX INJ NEW DRUG ADDON: CPT

## 2024-07-14 PROCEDURE — 99285 EMERGENCY DEPT VISIT HI MDM: CPT

## 2024-07-14 PROCEDURE — 84484 ASSAY OF TROPONIN QUANT: CPT

## 2024-07-14 PROCEDURE — 99285 EMERGENCY DEPT VISIT HI MDM: CPT | Performed by: EMERGENCY MEDICINE

## 2024-07-14 PROCEDURE — 93005 ELECTROCARDIOGRAM TRACING: CPT

## 2024-07-14 PROCEDURE — 36415 COLL VENOUS BLD VENIPUNCTURE: CPT | Performed by: EMERGENCY MEDICINE

## 2024-07-14 PROCEDURE — 83735 ASSAY OF MAGNESIUM: CPT | Performed by: EMERGENCY MEDICINE

## 2024-07-14 PROCEDURE — 85025 COMPLETE CBC W/AUTO DIFF WBC: CPT | Performed by: EMERGENCY MEDICINE

## 2024-07-14 PROCEDURE — 2500000004 HC RX 250 GENERAL PHARMACY W/ HCPCS (ALT 636 FOR OP/ED)

## 2024-07-14 PROCEDURE — 96361 HYDRATE IV INFUSION ADD-ON: CPT

## 2024-07-14 PROCEDURE — 81001 URINALYSIS AUTO W/SCOPE: CPT | Performed by: EMERGENCY MEDICINE

## 2024-07-14 PROCEDURE — 2500000004 HC RX 250 GENERAL PHARMACY W/ HCPCS (ALT 636 FOR OP/ED): Performed by: EMERGENCY MEDICINE

## 2024-07-14 PROCEDURE — 80053 COMPREHEN METABOLIC PANEL: CPT | Performed by: EMERGENCY MEDICINE

## 2024-07-14 PROCEDURE — 93010 ELECTROCARDIOGRAM REPORT: CPT | Performed by: EMERGENCY MEDICINE

## 2024-07-14 PROCEDURE — 83690 ASSAY OF LIPASE: CPT | Performed by: EMERGENCY MEDICINE

## 2024-07-14 RX ORDER — FAMOTIDINE 10 MG/ML
20 INJECTION INTRAVENOUS ONCE
Status: COMPLETED | OUTPATIENT
Start: 2024-07-14 | End: 2024-07-14

## 2024-07-14 RX ORDER — MORPHINE SULFATE 4 MG/ML
4 INJECTION INTRAVENOUS ONCE
Status: COMPLETED | OUTPATIENT
Start: 2024-07-14 | End: 2024-07-14

## 2024-07-14 RX ORDER — ACETAMINOPHEN 325 MG/1
650 TABLET ORAL ONCE
Status: COMPLETED | OUTPATIENT
Start: 2024-07-14 | End: 2024-07-14

## 2024-07-14 RX ORDER — ONDANSETRON HYDROCHLORIDE 2 MG/ML
4 INJECTION, SOLUTION INTRAVENOUS ONCE
Status: COMPLETED | OUTPATIENT
Start: 2024-07-14 | End: 2024-07-14

## 2024-07-14 ASSESSMENT — PAIN SCALES - GENERAL: PAINLEVEL_OUTOF10: 7

## 2024-07-14 ASSESSMENT — PAIN - FUNCTIONAL ASSESSMENT: PAIN_FUNCTIONAL_ASSESSMENT: 0-10

## 2024-07-14 ASSESSMENT — PAIN DESCRIPTION - LOCATION: LOCATION: ABDOMEN

## 2024-07-15 ENCOUNTER — APPOINTMENT (OUTPATIENT)
Dept: RADIOLOGY | Facility: HOSPITAL | Age: 59
DRG: 827 | End: 2024-07-15
Payer: COMMERCIAL

## 2024-07-15 ENCOUNTER — CLINICAL SUPPORT (OUTPATIENT)
Dept: EMERGENCY MEDICINE | Facility: HOSPITAL | Age: 59
DRG: 827 | End: 2024-07-15
Payer: COMMERCIAL

## 2024-07-15 PROBLEM — K56.609 SMALL BOWEL OBSTRUCTION (MULTI): Status: ACTIVE | Noted: 2024-07-15

## 2024-07-15 LAB
ALBUMIN SERPL BCP-MCNC: 3.9 G/DL (ref 3.4–5)
ALP SERPL-CCNC: 66 U/L (ref 33–120)
ALT SERPL W P-5'-P-CCNC: 12 U/L (ref 10–52)
ANION GAP SERPL CALC-SCNC: 12 MMOL/L (ref 10–20)
AST SERPL W P-5'-P-CCNC: 15 U/L (ref 9–39)
ATRIAL RATE: 63 BPM
BILIRUB SERPL-MCNC: 1.9 MG/DL (ref 0–1.2)
BNP SERPL-MCNC: 8 PG/ML (ref 0–99)
BUN SERPL-MCNC: 17 MG/DL (ref 6–23)
CALCIUM SERPL-MCNC: 9 MG/DL (ref 8.6–10.6)
CHLORIDE SERPL-SCNC: 100 MMOL/L (ref 98–107)
CO2 SERPL-SCNC: 30 MMOL/L (ref 21–32)
CREAT SERPL-MCNC: 1.25 MG/DL (ref 0.5–1.3)
EGFRCR SERPLBLD CKD-EPI 2021: 67 ML/MIN/1.73M*2
ERYTHROCYTE [DISTWIDTH] IN BLOOD BY AUTOMATED COUNT: 12.3 % (ref 11.5–14.5)
GLUCOSE SERPL-MCNC: 92 MG/DL (ref 74–99)
HCT VFR BLD AUTO: 41.5 % (ref 41–52)
HGB BLD-MCNC: 13.9 G/DL (ref 13.5–17.5)
HOLD SPECIMEN: NORMAL
MAGNESIUM SERPL-MCNC: 2.13 MG/DL (ref 1.6–2.4)
MCH RBC QN AUTO: 30.3 PG (ref 26–34)
MCHC RBC AUTO-ENTMCNC: 33.5 G/DL (ref 32–36)
MCV RBC AUTO: 91 FL (ref 80–100)
NRBC BLD-RTO: 0 /100 WBCS (ref 0–0)
P AXIS: 44 DEGREES
P OFFSET: 183 MS
P ONSET: 134 MS
PLATELET # BLD AUTO: 150 X10*3/UL (ref 150–450)
POTASSIUM SERPL-SCNC: 3.9 MMOL/L (ref 3.5–5.3)
PR INTERVAL: 164 MS
PROT SERPL-MCNC: 6.3 G/DL (ref 6.4–8.2)
Q ONSET: 216 MS
QRS COUNT: 10 BEATS
QRS DURATION: 82 MS
QT INTERVAL: 386 MS
QTC CALCULATION(BAZETT): 395 MS
QTC FREDERICIA: 392 MS
R AXIS: 37 DEGREES
RBC # BLD AUTO: 4.58 X10*6/UL (ref 4.5–5.9)
SODIUM SERPL-SCNC: 138 MMOL/L (ref 136–145)
T AXIS: 31 DEGREES
T OFFSET: 409 MS
VENTRICULAR RATE: 63 BPM
WBC # BLD AUTO: 8.8 X10*3/UL (ref 4.4–11.3)

## 2024-07-15 PROCEDURE — 96365 THER/PROPH/DIAG IV INF INIT: CPT | Mod: 59

## 2024-07-15 PROCEDURE — 93010 ELECTROCARDIOGRAM REPORT: CPT | Performed by: INTERNAL MEDICINE

## 2024-07-15 PROCEDURE — C9113 INJ PANTOPRAZOLE SODIUM, VIA: HCPCS

## 2024-07-15 PROCEDURE — 2500000001 HC RX 250 WO HCPCS SELF ADMINISTERED DRUGS (ALT 637 FOR MEDICARE OP)

## 2024-07-15 PROCEDURE — 83735 ASSAY OF MAGNESIUM: CPT

## 2024-07-15 PROCEDURE — 71250 CT THORAX DX C-: CPT | Performed by: RADIOLOGY

## 2024-07-15 PROCEDURE — 96376 TX/PRO/DX INJ SAME DRUG ADON: CPT

## 2024-07-15 PROCEDURE — 2500000004 HC RX 250 GENERAL PHARMACY W/ HCPCS (ALT 636 FOR OP/ED)

## 2024-07-15 PROCEDURE — 80053 COMPREHEN METABOLIC PANEL: CPT

## 2024-07-15 PROCEDURE — 93005 ELECTROCARDIOGRAM TRACING: CPT

## 2024-07-15 PROCEDURE — 71045 X-RAY EXAM CHEST 1 VIEW: CPT

## 2024-07-15 PROCEDURE — 86316 IMMUNOASSAY TUMOR OTHER: CPT

## 2024-07-15 PROCEDURE — 36415 COLL VENOUS BLD VENIPUNCTURE: CPT

## 2024-07-15 PROCEDURE — 2550000001 HC RX 255 CONTRASTS: Performed by: EMERGENCY MEDICINE

## 2024-07-15 PROCEDURE — 74177 CT ABD & PELVIS W/CONTRAST: CPT | Performed by: STUDENT IN AN ORGANIZED HEALTH CARE EDUCATION/TRAINING PROGRAM

## 2024-07-15 PROCEDURE — 83880 ASSAY OF NATRIURETIC PEPTIDE: CPT | Performed by: ANESTHESIOLOGY

## 2024-07-15 PROCEDURE — 99223 1ST HOSP IP/OBS HIGH 75: CPT | Performed by: ANESTHESIOLOGY

## 2024-07-15 PROCEDURE — 1200000003 HC ONCOLOGY  ROOM WITH TELEMETRY DAILY

## 2024-07-15 PROCEDURE — 74177 CT ABD & PELVIS W/CONTRAST: CPT

## 2024-07-15 PROCEDURE — 71045 X-RAY EXAM CHEST 1 VIEW: CPT | Performed by: RADIOLOGY

## 2024-07-15 PROCEDURE — 96366 THER/PROPH/DIAG IV INF ADDON: CPT

## 2024-07-15 PROCEDURE — 85027 COMPLETE CBC AUTOMATED: CPT

## 2024-07-15 PROCEDURE — 71250 CT THORAX DX C-: CPT

## 2024-07-15 RX ORDER — ACETAMINOPHEN 10 MG/ML
1000 INJECTION, SOLUTION INTRAVENOUS EVERY 8 HOURS
Status: DISCONTINUED | OUTPATIENT
Start: 2024-07-15 | End: 2024-07-16

## 2024-07-15 RX ORDER — ENOXAPARIN SODIUM 100 MG/ML
40 INJECTION SUBCUTANEOUS ONCE
Status: CANCELLED | OUTPATIENT
Start: 2024-07-17 | End: 2024-07-15

## 2024-07-15 RX ORDER — ONDANSETRON HYDROCHLORIDE 2 MG/ML
4 INJECTION, SOLUTION INTRAVENOUS EVERY 6 HOURS PRN
Status: DISCONTINUED | OUTPATIENT
Start: 2024-07-15 | End: 2024-07-19

## 2024-07-15 RX ORDER — POTASSIUM CHLORIDE 14.9 MG/ML
20 INJECTION INTRAVENOUS
Status: DISPENSED | OUTPATIENT
Start: 2024-07-15 | End: 2024-07-15

## 2024-07-15 RX ORDER — NAPROXEN SODIUM 220 MG/1
81 TABLET, FILM COATED ORAL DAILY
Status: DISCONTINUED | OUTPATIENT
Start: 2024-07-15 | End: 2024-07-18

## 2024-07-15 RX ORDER — SODIUM CHLORIDE, SODIUM LACTATE, POTASSIUM CHLORIDE, CALCIUM CHLORIDE 600; 310; 30; 20 MG/100ML; MG/100ML; MG/100ML; MG/100ML
75 INJECTION, SOLUTION INTRAVENOUS CONTINUOUS
Status: DISCONTINUED | OUTPATIENT
Start: 2024-07-15 | End: 2024-07-17

## 2024-07-15 RX ORDER — PANTOPRAZOLE SODIUM 40 MG/10ML
40 INJECTION, POWDER, LYOPHILIZED, FOR SOLUTION INTRAVENOUS DAILY
Status: DISCONTINUED | OUTPATIENT
Start: 2024-07-15 | End: 2024-07-19

## 2024-07-15 RX ORDER — BISMUTH SUBSALICYLATE 262 MG
1 TABLET,CHEWABLE ORAL DAILY
COMMUNITY

## 2024-07-15 RX ORDER — CHOLECALCIFEROL (VITAMIN D3) 25 MCG
1000 TABLET ORAL DAILY
COMMUNITY

## 2024-07-15 RX ORDER — ENOXAPARIN SODIUM 100 MG/ML
40 INJECTION SUBCUTANEOUS EVERY 24 HOURS
Status: DISCONTINUED | OUTPATIENT
Start: 2024-07-15 | End: 2024-07-17

## 2024-07-15 RX ORDER — ONDANSETRON HYDROCHLORIDE 2 MG/ML
INJECTION, SOLUTION INTRAVENOUS
Status: COMPLETED
Start: 2024-07-15 | End: 2024-07-15

## 2024-07-15 RX ORDER — SODIUM CHLORIDE, SODIUM LACTATE, POTASSIUM CHLORIDE, CALCIUM CHLORIDE 600; 310; 30; 20 MG/100ML; MG/100ML; MG/100ML; MG/100ML
125 INJECTION, SOLUTION INTRAVENOUS CONTINUOUS
Status: DISCONTINUED | OUTPATIENT
Start: 2024-07-15 | End: 2024-07-15

## 2024-07-15 RX ORDER — HYDROMORPHONE HYDROCHLORIDE 0.2 MG/ML
0.2 INJECTION INTRAMUSCULAR; INTRAVENOUS; SUBCUTANEOUS EVERY 4 HOURS PRN
Status: DISCONTINUED | OUTPATIENT
Start: 2024-07-15 | End: 2024-07-17

## 2024-07-15 RX ORDER — ONDANSETRON HYDROCHLORIDE 2 MG/ML
4 INJECTION, SOLUTION INTRAVENOUS ONCE
Status: COMPLETED | OUTPATIENT
Start: 2024-07-15 | End: 2024-07-15

## 2024-07-15 SDOH — SOCIAL STABILITY: SOCIAL INSECURITY: DOES ANYONE TRY TO KEEP YOU FROM HAVING/CONTACTING OTHER FRIENDS OR DOING THINGS OUTSIDE YOUR HOME?: NO

## 2024-07-15 SDOH — ECONOMIC STABILITY: HOUSING INSECURITY: IN THE LAST 12 MONTHS, WAS THERE A TIME WHEN YOU WERE NOT ABLE TO PAY THE MORTGAGE OR RENT ON TIME?: NO

## 2024-07-15 SDOH — SOCIAL STABILITY: SOCIAL INSECURITY: HAVE YOU HAD ANY THOUGHTS OF HARMING ANYONE ELSE?: NO

## 2024-07-15 SDOH — ECONOMIC STABILITY: TRANSPORTATION INSECURITY
IN THE PAST 12 MONTHS, HAS THE LACK OF TRANSPORTATION KEPT YOU FROM MEDICAL APPOINTMENTS OR FROM GETTING MEDICATIONS?: NO

## 2024-07-15 SDOH — ECONOMIC STABILITY: FOOD INSECURITY: WITHIN THE PAST 12 MONTHS, YOU WORRIED THAT YOUR FOOD WOULD RUN OUT BEFORE YOU GOT MONEY TO BUY MORE.: NEVER TRUE

## 2024-07-15 SDOH — ECONOMIC STABILITY: HOUSING INSECURITY: IN THE PAST 12 MONTHS HAS THE ELECTRIC, GAS, OIL, OR WATER COMPANY THREATENED TO SHUT OFF SERVICES IN YOUR HOME?: NO

## 2024-07-15 SDOH — ECONOMIC STABILITY: GENERAL

## 2024-07-15 SDOH — ECONOMIC STABILITY: INCOME INSECURITY: IN THE LAST 12 MONTHS, WAS THERE A TIME WHEN YOU WERE NOT ABLE TO PAY THE MORTGAGE OR RENT ON TIME?: NO

## 2024-07-15 SDOH — SOCIAL STABILITY: SOCIAL INSECURITY: ARE THERE ANY APPARENT SIGNS OF INJURIES/BEHAVIORS THAT COULD BE RELATED TO ABUSE/NEGLECT?: NO

## 2024-07-15 SDOH — ECONOMIC STABILITY: HOUSING INSECURITY
IN THE LAST 12 MONTHS, WAS THERE A TIME WHEN YOU DID NOT HAVE A STEADY PLACE TO SLEEP OR SLEPT IN A SHELTER (INCLUDING NOW)?: NO

## 2024-07-15 SDOH — SOCIAL STABILITY: SOCIAL INSECURITY: ARE YOU OR HAVE YOU BEEN THREATENED OR ABUSED PHYSICALLY, EMOTIONALLY, OR SEXUALLY BY ANYONE?: NO

## 2024-07-15 SDOH — ECONOMIC STABILITY: FOOD INSECURITY: WITHIN THE PAST 12 MONTHS, THE FOOD YOU BOUGHT JUST DIDN'T LAST AND YOU DIDN'T HAVE MONEY TO GET MORE.: NEVER TRUE

## 2024-07-15 SDOH — ECONOMIC STABILITY: FOOD INSECURITY

## 2024-07-15 SDOH — ECONOMIC STABILITY: HOUSING INSECURITY

## 2024-07-15 SDOH — ECONOMIC STABILITY: TRANSPORTATION INSECURITY: IN THE PAST 12 MONTHS, HAS LACK OF TRANSPORTATION KEPT YOU FROM MEDICAL APPOINTMENTS OR FROM GETTING MEDICATIONS?: NO

## 2024-07-15 SDOH — SOCIAL STABILITY: SOCIAL INSECURITY: DO YOU FEEL UNSAFE GOING BACK TO THE PLACE WHERE YOU ARE LIVING?: NO

## 2024-07-15 SDOH — ECONOMIC STABILITY: HOUSING INSECURITY: IN THE LAST 12 MONTHS, HOW MANY PLACES HAVE YOU LIVED?: 1

## 2024-07-15 SDOH — SOCIAL STABILITY: SOCIAL INSECURITY: DO YOU FEEL ANYONE HAS EXPLOITED OR TAKEN ADVANTAGE OF YOU FINANCIALLY OR OF YOUR PERSONAL PROPERTY?: NO

## 2024-07-15 SDOH — SOCIAL STABILITY: SOCIAL INSECURITY: HAS ANYONE EVER THREATENED TO HURT YOUR FAMILY OR YOUR PETS?: NO

## 2024-07-15 SDOH — SOCIAL STABILITY: SOCIAL INSECURITY: ABUSE: ADULT

## 2024-07-15 SDOH — SOCIAL STABILITY: SOCIAL INSECURITY: WERE YOU ABLE TO COMPLETE ALL THE BEHAVIORAL HEALTH SCREENINGS?: YES

## 2024-07-15 SDOH — ECONOMIC STABILITY: FOOD INSECURITY: WITHIN THE PAST 12 MONTHS, YOU WORRIED THAT YOUR FOOD WOULD RUN OUT BEFORE YOU GOT THE MONEY TO BUY MORE.: NEVER TRUE

## 2024-07-15 SDOH — SOCIAL STABILITY: SOCIAL INSECURITY: HAVE YOU HAD THOUGHTS OF HARMING ANYONE ELSE?: NO

## 2024-07-15 SDOH — ECONOMIC STABILITY: TRANSPORTATION INSECURITY
IN THE PAST 12 MONTHS, HAS LACK OF TRANSPORTATION KEPT YOU FROM MEETINGS, WORK, OR FROM GETTING THINGS NEEDED FOR DAILY LIVING?: NO

## 2024-07-15 SDOH — ECONOMIC STABILITY: TRANSPORTATION INSECURITY

## 2024-07-15 ASSESSMENT — COGNITIVE AND FUNCTIONAL STATUS - GENERAL
MOBILITY SCORE: 24
MOBILITY SCORE: 24
DAILY ACTIVITIY SCORE: 24
DAILY ACTIVITIY SCORE: 24
PATIENT BASELINE BEDBOUND: NO
MOBILITY SCORE: 24

## 2024-07-15 ASSESSMENT — ACTIVITIES OF DAILY LIVING (ADL)
LACK_OF_TRANSPORTATION: NO
LACK_OF_TRANSPORTATION: PATIENT DECLINED
JUDGMENT_ADEQUATE_SAFELY_COMPLETE_DAILY_ACTIVITIES: YES
PATIENT'S MEMORY ADEQUATE TO SAFELY COMPLETE DAILY ACTIVITIES?: YES
DRESSING YOURSELF: INDEPENDENT
WALKS IN HOME: INDEPENDENT
HEARING - RIGHT EAR: FUNCTIONAL
ADEQUATE_TO_COMPLETE_ADL: YES
BATHING: INDEPENDENT
GROOMING: INDEPENDENT
HEARING - LEFT EAR: FUNCTIONAL
TOILETING: INDEPENDENT
FEEDING YOURSELF: INDEPENDENT

## 2024-07-15 ASSESSMENT — ENCOUNTER SYMPTOMS
LIGHT-HEADEDNESS: 0
PALPITATIONS: 0
UNEXPECTED WEIGHT CHANGE: 0
COUGH: 0
VOMITING: 0
DIAPHORESIS: 0
BLOOD IN STOOL: 0
ABDOMINAL PAIN: 1
CHILLS: 0
DIFFICULTY URINATING: 0
ACTIVITY CHANGE: 0
HEADACHES: 0
FEVER: 0
FATIGUE: 0
SHORTNESS OF BREATH: 0
APPETITE CHANGE: 1
DIZZINESS: 0
NAUSEA: 0
CHEST TIGHTNESS: 0
ABDOMINAL DISTENTION: 0

## 2024-07-15 ASSESSMENT — PAIN - FUNCTIONAL ASSESSMENT
PAIN_FUNCTIONAL_ASSESSMENT: 0-10
PAIN_FUNCTIONAL_ASSESSMENT: 0-10

## 2024-07-15 ASSESSMENT — LIFESTYLE VARIABLES
HOW MANY STANDARD DRINKS CONTAINING ALCOHOL DO YOU HAVE ON A TYPICAL DAY: PATIENT DECLINED
AUDIT-C TOTAL SCORE: -1
HOW OFTEN DO YOU HAVE 6 OR MORE DRINKS ON ONE OCCASION: PATIENT DECLINED
AUDIT-C TOTAL SCORE: -1
HOW OFTEN DO YOU HAVE A DRINK CONTAINING ALCOHOL: PATIENT DECLINED
SKIP TO QUESTIONS 9-10: 0

## 2024-07-15 ASSESSMENT — PATIENT HEALTH QUESTIONNAIRE - PHQ9
SUM OF ALL RESPONSES TO PHQ9 QUESTIONS 1 & 2: 0
1. LITTLE INTEREST OR PLEASURE IN DOING THINGS: NOT AT ALL
2. FEELING DOWN, DEPRESSED OR HOPELESS: NOT AT ALL

## 2024-07-15 ASSESSMENT — PAIN SCALES - GENERAL
PAINLEVEL_OUTOF10: 0 - NO PAIN
PAINLEVEL_OUTOF10: 2
PAINLEVEL_OUTOF10: 0 - NO PAIN

## 2024-07-15 ASSESSMENT — SOCIAL DETERMINANTS OF HEALTH (SDOH): IN THE PAST 12 MONTHS, HAS THE ELECTRIC, GAS, OIL, OR WATER COMPANY THREATENED TO SHUT OFF SERVICE IN YOUR HOME?: NO

## 2024-07-15 NOTE — PROGRESS NOTES
I received Saul Ramirez in signout from Dr. Wilkes.  Please see the previous note for all HPI, PE and MDM up to the time of signout at 2300.    In brief Saul Ramirez is an 58 y.o. male presenting for   Chief Complaint   Patient presents with    Abdominal Pain   .  At the time of signout we were awaiting: CT abdomen pelvis    Patient CT abdomen pelvis showed:  1. Findings suggestive of small-bowel obstruction with dilation of  jejunal loops measuring up to 3.4 cm with a transition point in the  midline pelvis where there is an apparent 1.5 x 1.7 cm enhancing  lesion. If patient has history of prior abdominal surgery, this may  be an adhesion with the enhancement reflecting collapsed bowel loop.  If patient does not have prior abdominal surgery, this may be a small  bowel lesion, most commonly a neuroendocrine tumor. Recommend  surgical consultation.  2. Small volume free fluid in the pelvis and interloop edema, likely  reactive to the above process.  3. Focal narrowing at the junction of the gastric body and antrum is  favored to represent peristalsis/contraction but underlying lesion  can not be excluded.   Patient denies history of prior abdominal surgeries.  Acute care surgery was consulted.  Patient started on 125 mL/h lactated Ringer's maintenance for surgery recommendation.  Patient admitted to surg-Onc under Dr. Fong in stable condition.  Patient understood and was agreeable with the plan.        Pt Disposition: admitted to surg-Onc under Dr. Fong in stable condition.

## 2024-07-15 NOTE — ED PROVIDER NOTES
History of Present Illness     History provided by: Patient  Limitations to History: None  External Records Reviewed with Brief Summary: None    HPI:  Saul Ramirez is a 58 y.o. male with past medical history of CAD, hypertension, HLD presents emergency room for epigastric abdominal pain with vomiting.  Patient states that he was having epigastric pain since Saturday at 11 AM.  Patient has not been able to keep down solids but is able to drink liquids slowly.  Patient states that he has felt this pain before and feels like it is a stomach bug that he had in the past however it has not improved over the past 24 hours.  Patient states that he took some Pepto-Bismol which did not relieve the pain.  Patient denies chest pain, shortness of breath, cough, fever, chills.  Denies taking excess amount of ibuprofen.    Physical Exam   Triage vitals:  T 35.9 °C (96.6 °F)  HR 74  /77  RR 16  O2 97 % None (Room air)    Physical Exam  Constitutional:       Appearance: He is well-developed and normal weight.   HENT:      Head: Normocephalic and atraumatic.      Mouth/Throat:      Mouth: Mucous membranes are moist.   Eyes:      Extraocular Movements: Extraocular movements intact.      Pupils: Pupils are equal, round, and reactive to light.   Cardiovascular:      Rate and Rhythm: Normal rate and regular rhythm.      Heart sounds: Normal heart sounds.   Pulmonary:      Effort: Pulmonary effort is normal.      Breath sounds: Normal breath sounds.   Abdominal:      General: Abdomen is flat. Bowel sounds are normal. There is no distension.      Tenderness: There is abdominal tenderness in the epigastric area. There is no guarding or rebound.      Hernia: No hernia is present.   Skin:     General: Skin is warm.      Capillary Refill: Capillary refill takes less than 2 seconds.   Neurological:      General: No focal deficit present.      Mental Status: He is alert and oriented to person, place, and time.   Psychiatric:          Mood and Affect: Mood normal.         Behavior: Behavior normal.          Medical Decision Making & ED Course   Medical Decision Makin y.o. male with past medical history of CAD, hypertension, HLD presents emergency room for epigastric abdominal pain with vomiting.  Differential diagnosis included but not limited to pancreatitis, small bowel obstruction, urinary tract infection, myocardial infarction, gastroenteritis, diverticulitis, peptic ulcer disease. UA shows no evidence of urinary tract infection.  Lipase is within normal limits making this less likely pancreatitis.  CMP shows a elevated to total bilirubin of 2.1 however similar to baseline.  CBC is within normal limits.  Troponin, magnesium is within normal.  EKG shows no ischemic changes making this less likely MI.  Patient was given Tylenol 650 mg along with Zofran 4 mg for the nausea and pain.  Was given 1 L of LR.  CT abdomen pelvis with IV contrast was ordered and pending final reads.  Patient was handed off to oncoming provider for follow-up of CT for final disposition.  Patient has been vitally stable throughout entire visit.  ----      Social Determinants of Health which Significantly Impact Care: None identified     EKG Independent Interpretation: EKG interpreted by myself. Please see ED Course for full interpretation.    Independent Result Review and Interpretation: Relevant laboratory and radiographic results were reviewed and independently interpreted by myself.  As necessary, they are commented on in the ED Course.    Chronic conditions affecting the patient's care: As documented above in [facility]    The patient was discussed with the following consultants/services: None    Care Considerations: As documented above in [facility]    ED Course:  ED Course as of 24 1352   Sun 3 EKG shows normal sinus rhythm, no axis deviation, rate of 63, NM of 164, QRS of 82, QTc of 395, no ST elevations, no ST depressions, no T wave inversions.  [YG]      ED Course User Index  [YG] Yesi Pike MD         Diagnoses as of 07/17/24 1354   Small bowel obstruction (Multi)     Disposition   Patient was signed out to Dr. Astudillo at 11 PM pending completion of their work-up.  Please see the next provider's transition of care note for the remainder of the patient's care.     Procedures   Procedures    Patient seen and discussed with ED attending physician.    Yesi Pike MD  Emergency Medicine     Yesi Pike MD  Resident  07/17/24 5242

## 2024-07-15 NOTE — H&P
Surgical Oncology History and Physical  Subjective   Chief Complaint/Reason for Admission: SBO, jejunal mass    HPI:  Saul Ramirez is a 58 y.o. male with a past medical history of CAD, HTN, HLD who presented to the ED overnight with abdominal pain, nausea, vomiting which started around 11 AM on Saturday. The pain started acutely as a stabbing sensation in the middle of his abdomen. The most bothersome symptom to him has been the vomiting. At first he thought he had food poisoning, however he decided to come to the ED when he was not noticing any signs of improvement. His most recent episode of emesis was around noon on Sunday. His most recent bowel movement was also around noon on Sunday. He has not been passing flatus since. He also reports feeling chilled and a bit dizzy. He denies a recent history of flushing, wheezing, or diarrhea.    A 12-point ROS was performed and was unremarkable except as above.    PMH:  CAD (Feb 2024 stress echo - resting EF 65%, ischemic EKG changes during peak stress period)  HTN  HLD  Squamous cell carcinoma of the skin    PSH:  Colonoscopy 2016 - WNL; repeat ordered for 2026  Repair of ruptured achilles tendon 2013  Denies prior abdominal surgeries     Soc Hx:  Social ETOH use  Denies tobacco, recreational drugs  Social History     Socioeconomic History    Marital status:      Spouse name: Not on file    Number of children: 2    Years of education: Not on file    Highest education level: Not on file   Occupational History    Occupation: [employer]   Tobacco Use    Smoking status: Never    Smokeless tobacco: Never   Substance and Sexual Activity    Alcohol use: Yes     Alcohol/week: 8.0 standard drinks of alcohol     Types: 2 Glasses of wine, 6 Cans of beer per week    Drug use: Never    Sexual activity: Not on file   Other Topics Concern    Not on file   Social History Narrative    Not on file     Social Determinants of Health     Financial  Resource Strain: Not on file   Food Insecurity: Not on file   Transportation Needs: Not on file   Physical Activity: Sufficiently Active (11/17/2023)    Exercise Vital Sign     Days of Exercise per Week: 6 days     Minutes of Exercise per Session: 30 min   Stress: Not on file   Social Connections: Not on file   Intimate Partner Violence: Not on file   Housing Stability: Not on file     Fam Hx:  Family History   Problem Relation Name Age of Onset    Pancreatic cancer Mother      Coronary artery disease Father        Allergies:  Allergies   Allergen Reactions    Ace Inhibitors Cough    Amoxicillin Rash     Current Medications:  No current facility-administered medications on file prior to encounter.     Current Outpatient Medications on File Prior to Encounter   Medication Sig Dispense Refill    aspirin 81 mg EC tablet Take 1 tablet (81 mg) by mouth once daily.      atorvastatin (Lipitor) 40 mg tablet TAKE 1 TABLET BY MOUTH EVERY DAY 90 tablet 1    hydroCHLOROthiazide (HYDRODiuril) 12.5 mg tablet TAKE 1 TABLET BY MOUTH EVERY DAY (Patient taking differently: Take 1 tablet (12.5 mg) by mouth every other day.) 90 tablet 1    losartan (Cozaar) 100 mg tablet TAKE 1 TABLET BY MOUTH EVERY DAY 90 tablet 1    sildenafil (Viagra) 100 mg tablet TAKE 1 TABLET DAILY AS NEEDED APPROXIMATELY 1 HOUR BEFORE SEXUAL ACTIVITY 6 tablet 11         Objective   Vitals:  Temperature:  [35.9 °C (96.6 °F)] 35.9 °C (96.6 °F)  Heart Rate:  [56-74] 64  Respirations:  [15-18] 15  BP: (125-133)/(76-86) 127/86    Physical Exam:  GEN: No acute distress. Alert, awake and conversive.  HEENT: Sclera anicteric. Moist mucous membranes.  RESP: Breathing non-labored, equal chest rise. On RA.  CV: Regular rate, normotensive  GI: Abdomen soft, nondistended, mildly tender at umbilicus, otherwise nontender. No guarding. Not peritonitic.   : Deferred.  MSK: No gross deformities. Moves all extremities spontaneously.  NEURO: Alert and oriented x3. No focal  deficits.  PSYCH: Appropriate mood and affect.  SKIN: No rashes or lesions.    Labs within past 24h:  Results for orders placed or performed during the hospital encounter of 07/14/24 (from the past 24 hour(s))   CBC with Differential   Result Value Ref Range    WBC 9.1 4.4 - 11.3 x10*3/uL    nRBC 0.0 0.0 - 0.0 /100 WBCs    RBC 5.17 4.50 - 5.90 x10*6/uL    Hemoglobin 15.3 13.5 - 17.5 g/dL    Hematocrit 43.2 41.0 - 52.0 %    MCV 84 80 - 100 fL    MCH 29.6 26.0 - 34.0 pg    MCHC 35.4 32.0 - 36.0 g/dL    RDW 12.0 11.5 - 14.5 %    Platelets 171 150 - 450 x10*3/uL    Neutrophils % 77.9 40.0 - 80.0 %    Immature Granulocytes %, Automated 0.4 0.0 - 0.9 %    Lymphocytes % 12.4 13.0 - 44.0 %    Monocytes % 8.3 2.0 - 10.0 %    Eosinophils % 0.7 0.0 - 6.0 %    Basophils % 0.3 0.0 - 2.0 %    Neutrophils Absolute 7.12 1.20 - 7.70 x10*3/uL    Immature Granulocytes Absolute, Automated 0.04 0.00 - 0.70 x10*3/uL    Lymphocytes Absolute 1.13 (L) 1.20 - 4.80 x10*3/uL    Monocytes Absolute 0.76 0.10 - 1.00 x10*3/uL    Eosinophils Absolute 0.06 0.00 - 0.70 x10*3/uL    Basophils Absolute 0.03 0.00 - 0.10 x10*3/uL   Comprehensive Metabolic Panel   Result Value Ref Range    Glucose 99 74 - 99 mg/dL    Sodium 141 136 - 145 mmol/L    Potassium 3.6 3.5 - 5.3 mmol/L    Chloride 99 98 - 107 mmol/L    Bicarbonate 31 21 - 32 mmol/L    Anion Gap 15 10 - 20 mmol/L    Urea Nitrogen 16 6 - 23 mg/dL    Creatinine 1.15 0.50 - 1.30 mg/dL    eGFR 74 >60 mL/min/1.73m*2    Calcium 10.1 8.6 - 10.6 mg/dL    Albumin 4.8 3.4 - 5.0 g/dL    Alkaline Phosphatase 75 33 - 120 U/L    Total Protein 7.4 6.4 - 8.2 g/dL    AST 19 9 - 39 U/L    Bilirubin, Total 2.1 (H) 0.0 - 1.2 mg/dL    ALT 15 10 - 52 U/L   Lipase   Result Value Ref Range    Lipase 66 9 - 82 U/L   Troponin I, High Sensitivity   Result Value Ref Range    Troponin I, High Sensitivity (CMC) 7 0 - 53 ng/L   Magnesium   Result Value Ref Range    Magnesium 2.24 1.60 - 2.40 mg/dL   Urinalysis with Reflex  Culture and Microscopic   Result Value Ref Range    Color, Urine Yellow Light-Yellow, Yellow, Dark-Yellow    Appearance, Urine Clear Clear    Specific Gravity, Urine 1.032 1.005 - 1.035    pH, Urine 6.0 5.0, 5.5, 6.0, 6.5, 7.0, 7.5, 8.0    Protein, Urine 20 (TRACE) NEGATIVE, 10 (TRACE), 20 (TRACE) mg/dL    Glucose, Urine Normal Normal mg/dL    Blood, Urine NEGATIVE NEGATIVE    Ketones, Urine 20 (1+) (A) NEGATIVE mg/dL    Bilirubin, Urine NEGATIVE NEGATIVE    Urobilinogen, Urine Normal Normal mg/dL    Nitrite, Urine NEGATIVE NEGATIVE    Leukocyte Esterase, Urine NEGATIVE NEGATIVE   Urinalysis Microscopic   Result Value Ref Range    WBC, Urine 1-5 1-5, NONE /HPF    RBC, Urine 1-2 NONE, 1-2, 3-5 /HPF       Imaging within past 24h:  CT abdomen pelvis w IV contrast    Result Date: 7/15/2024  Interpreted By:  Josh Reyes  and Geovanna Alejandra STUDY: CT ABDOMEN PELVIS W IV CONTRAST;  7/15/2024 1:00 am   INDICATION: Signs/Symptoms: Abdominal pain, epigastric.   COMPARISON: None.   ACCESSION NUMBER(S): [id number]   ORDERING CLINICIAN: NGUYEN CRAVEN   TECHNIQUE: Contiguous axial images of the abdomen and pelvis were obtained after the intravenous administration of iodinated contrast. Coronal and sagittal reformatted images were reconstructed from the axial data.   FINDINGS: LOWER CHEST: Bibasilar scarring/atelectasis, otherwise no acute abnormality. Severe coronary artery atherosclerosis.     ABDOMEN/PELVIS:   ABDOMINAL WALL: Tiny fat containing umbilical hernia.   LIVER: No significant parenchymal abnormality.   BILE DUCTS: No significant intrahepatic or extrahepatic dilatation.   GALLBLADDER: No significant abnormality.   PANCREAS: No significant abnormality.   SPLEEN: No significant abnormality.   ADRENALS: No significant abnormality.   KIDNEYS, URETERS, BLADDER: Bilateral simple renal cysts. No hydroureteronephrosis. Bladder is unremarkable.   REPRODUCTIVE ORGANS: No significant abnormality.    VESSELS: Mild atherosclerosis.   RETROPERITONEUM/LYMPH NODES: No enlarged lymph nodes. No acute retroperitoneal abnormality.   BOWEL/MESENTERY/PERITONEUM: Stomach is distended with focal area of narrowing at the junction of the gastric body and antrum (203/26). There is dilation of jejunal loops measuring up to 3.4 cm (203/40) with a transition point in the midline (201/116) where there is an enhancing lesion measuring 1.5 x 1.7 cm.   Small volume free fluid in the pelvis. Mild interloop edema is present.     MUSCULOSKELETAL: No acute osseous abnormality. No suspicious osseous lesion.       1. Findings suggestive of small-bowel obstruction with dilation of jejunal loops measuring up to 3.4 cm with a transition point in the midline pelvis where there is an apparent 1.5 x 1.7 cm enhancing lesion. If patient has history of prior abdominal surgery, this may be an adhesion with the enhancement reflecting collapsed bowel loop. If patient does not have prior abdominal surgery, this may be a small bowel lesion, most commonly a neuroendocrine tumor. Recommend surgical consultation. 2. Small volume free fluid in the pelvis and interloop edema, likely reactive to the above process. 3. Focal narrowing at the junction of the gastric body and antrum is favored to represent peristalsis/contraction but underlying lesion can not be excluded. This can be further evaluated with endoscopy or monitored on follow-up CTs for small-bowel obstruction.   I personally reviewed the images/study and I agree with the findings as stated by Justyn Welsh MD (Radiology Resident). This study was interpreted at [facility], [place], [place].   MACRO: Josh Reyes discussed the significance and urgency of this critical finding by telephone with ED physician covering for NGUYEN HERBER on 7/15/2024 at 2:03 am.  (**-RCF-**) Findings:  See findings.   Signed by: Josh Reyes 7/15/2024 2:11 AM  Dictation workstation:   [id number]        ASSESSMENT  Saul Ramirez is a 58 y.o. male with a past medical history of CAD, HTN, HLD who presented to the ED overnight with a 36 hour history of abdominal pain, nausea, and vomiting. In the ED, his vitals are stable and WNL. Labs are notable for WBC 9.1, T bili 2.1 (T bili has been elevated around this level for the past 5 years). Lipase 66, UA negative. His abdominal exam is benign with only mild khadijah-umbilical tenderness. CT A/P demonstrated dilated loops of small bowel proximal to a 1.5 x 1.7 cm mass in the jejunum. Patient has had no prior abdominal surgeries so this is more likely to represent adenocarcinoma vs. GIST vs. neuroendocrine tumor which will likely need to be removed surgically. Will also likely need to evaluate patient's operative risk given significant CAD (, stress echo with EF 65% however ischemic EKG changes with peak stress).    An NGT was placed in the ED with 300cc bilious output.     PLAN:  - Admission to surgical oncology   - NGT to [facility]      - Follow up [facility]   - NPO  - mIVF  - Hold the following home meds: atorvastatin 40mg daily, ASA 81, hydrochlorothiazide 12.5mg daily, losartan 100mg daily  - Strict I/O's   - PRN Zofran  - q8 IV Tylenol; PRN Dilauded     Patient's exam, labs, and findings discussed with Dr. Fong, who agrees with the plan as described above.    Kenyetta Gonzalez MD  PGY-2 General Surgery  Surgical Oncology [id number]

## 2024-07-15 NOTE — CONSULTS
Reason For Consult  Preoperative Evaluation for Diagnostic Laparoscopy and Possible Resection of Small Bowel Mass    History Of Present Illness  Saul Ramirez is a 58 y.o. male with history of CAD, HTN, HLD presenting with acute small bowel obstruction 2/2 to newly discovered jejunal mass. Perioperative medicine consulted for preoperative evaluation.    Patient presented to the ED with a couple days of sharp khadijah-umbilical abdominal pain and n/v. He reports that he had only experienced similar symptoms once before when he had food poisoning. He presented to the ED after the symptoms failed to improve after multiple days. He remained vitally stable in the ED and a NGT was placed with approximately 300 ml of bilious output. Patient currently denies any n/v, bloody stools. He has not been having BM but is passing flatus at this time. He denies any sob, chest pain, or significant abdominal pain at this time.     Patient reports that he is normally quite active and runs regularly without chest pain or sob. He runs or walks multiple miles 4-5 days per week without symptoms. He states he received a stress echo in 2/2024 after he received at elevated CAC score of 537. His stress echo showed some ECG changes c/f ischemia at peak stress but the echo portion of the exam was normal.     Past Medical History  CAD, HTN, HLD    Surgical History  He has a past surgical history that includes Other surgical history (05/23/2013); Colonoscopy (01/15/2016); and Colonoscopy (01/2016).   Achilles Tendon Repair    Social History  He reports that he has never smoked. He has never used smokeless tobacco. He reports current alcohol use of about 8.0 standard drinks of alcohol per week. He reports that he does not use drugs.    Family History  Family History   Problem Relation Name Age of Onset    Pancreatic cancer Mother      Coronary artery disease Father          Allergies  Ace inhibitors and Amoxicillin    Review of Systems  Review of  "Systems   Constitutional:  Positive for appetite change. Negative for activity change, chills, diaphoresis, fatigue, fever and unexpected weight change.   Respiratory:  Negative for cough, chest tightness and shortness of breath.    Cardiovascular:  Negative for chest pain, palpitations and leg swelling.   Gastrointestinal:  Positive for abdominal pain. Negative for abdominal distention, blood in stool, nausea and vomiting.   Genitourinary:  Negative for difficulty urinating.   Neurological:  Negative for dizziness, light-headedness and headaches.         Physical Exam  PHYSICAL EXAM  Vitals signs reviewed  Constitutional:       General: Not in acute distress     Appearance: Normal appearance. Not ill-appearing.  HENT:     Head: Normocephalic and atraumatic  Eyes:     Conjunctiva/sclera: Conjunctivae normal  Cardiovascular:     Rate and Rhythm: Normal rate and regular rhythm, no murmurs, rubs, gallops  Extremities: warm, well perfused, pulses palpable, no LE edema  Pulmonary:     Effort: No respiratory distress, no increased wob at rest. Lungs clear to auscultation bl  Abdominal:     Palpations: Abdomen is soft, non-tender, non-distended  Musculoskeletal: JERONIMO  Skin:     General: Skin is warm and dry  Neurological:     General: No focal deficit present  Psychiatric:         Mood and Affect: Mood normal         Behavior: Behavior normal      Last Recorded Vitals  Blood pressure 145/82, pulse 62, temperature 36.5 °C (97.7 °F), temperature source Temporal, resp. rate 18, height 1.803 m (5' 11\"), weight 86.2 kg (190 lb), SpO2 98%.    Relevant Results  Lab Results   Component Value Date    WBC 8.8 07/15/2024    HGB 13.9 07/15/2024    HCT 41.5 07/15/2024    MCV 91 07/15/2024     07/15/2024      Lab Results   Component Value Date    GLUCOSE 92 07/15/2024    CALCIUM 9.0 07/15/2024     07/15/2024    K 3.9 07/15/2024    CO2 30 07/15/2024     07/15/2024    BUN 17 07/15/2024    CREATININE 1.25 07/15/2024    "   CT Chest (7/15):  IMPRESSION:  1.  No evidence of thoracic malignancy/metastasis.  2. No evidence acute cardiopulmonary process.    CXR (7/15):  IMPRESSION:  1.  No evidence of acute cardiopulmonary process.  2. Enteric tube as above.    CT Abdomen/Pelvis (7/14):  IMPRESSION:  1. Findings suggestive of small-bowel obstruction with dilation of  jejunal loops measuring up to 3.4 cm with a transition point in the  midline pelvis where there is an apparent 1.5 x 1.7 cm enhancing  lesion. If patient has history of prior abdominal surgery, this may  be an adhesion with the enhancement reflecting collapsed bowel loop.  If patient does not have prior abdominal surgery, this may be a small  bowel lesion, most commonly a neuroendocrine tumor. Recommend  surgical consultation.  2. Small volume free fluid in the pelvis and interloop edema, likely  reactive to the above process.  3. Focal narrowing at the junction of the gastric body and antrum is  favored to represent peristalsis/contraction but underlying lesion  can not be excluded. This can be further evaluated with endoscopy or  monitored on follow-up CTs for small-bowel obstruction.    Stress Echo (2/27/24):    Baseline ECG: Resting ECG showed normal sinus rhythm with PAC's, PVC's.     Stress ECG: Stress ECG showed sinus tachycardia, with PAC's, PVC's. There was a 1.5 mm horizontal ST segment depression in leads II, III and aVF and V4,V5, and V6 during the peak stress period.    Baseline Echo: The resting baseline ejection fraction was estimated at 65%. There are no regional wall motion abnormalities at baseline.     Stress Echo: There are no stress induced regional wall motion abnormalities. The ejection fraction is approximately >75% at peak stress.    Summary:   1. ECG changes consistent with ischemia.   2. The resting ejection fraction was estimated at 65% with a peak exercise ejection fraction estimated at >75%.   3. Despite the above described EKG changes, there  were no corresponding echocardiographic signs of ischemia.   4. The De Leon score is 6.   5. Study not consistent with exercise induced diastolic dysfunction.   6. There is no evidence for exercise induced pulmonary hypertension.   7. Abnormal Stress Test.   8. Adequate level of stress achieved.    Assessment/Plan   Saul Ramirez is a 58 y.o. male with history of CAD, HTN, HLD presenting with acute small bowel obstruction 2/2 to newly discovered jejunal mass. Perioperative medicine consulted for preoperative evaluation.    #Acute small bowel obstruction 2/2 to mass  #Jejunal Mass  - p/w with abdominal pain and n/v  - CT A/P showed acute SBO in jejunum and jejunal mass  - Patient NPO, NG tube in place  - receiving mIVF  - receiving ppi and has been receiving zofran, morphine as needed  - Plan for OR later this week for diagnostic laparoscopy and possible mass resection    #CAD  #HTN  #HLD  - Patient denies any history of MI, HF, arrhythmia  - Patient has excellent functional capacity at baseline  - BP well controlled on home hydrochlorothiazide 12.5 mg, losartan 100 mg  - Pt takes ASA 81 mg, atorvastatin 40 mg daily  - CAC score of 537 in 11/2023  - stress echo (2/2024) showed some ECG changes c/f ischemia at peak stress but the echo portion of the exam was normal with EF 65% at rest    Known risk factors for perioperative complications: Coronary disease    Difficulty with intubation is not anticipated.  Patient has no prior concerns with anesthesia.    Cardiac Risk Estimation:     Surgery    Timing- Time sensitive    On my evaluation pt does not have any evidence of ACS/Acute CHF/Fatal arrhythmias/ Severe valvular disease  Most recent EKG reviewed and it shows NSR without any acute signs of ischemia or pathologic Q waves  Most recent Echo report reviewed and it shows ejection fraction of 65% at rest. There are no regional wall motion abnormalities at baseline.    RCRI risk score is 1 (intraperitoneal surgery)  Based  on [facility] activity Status Index the Functional Capacity is > 4METS    Patient has no concerning symptoms for angina or myocardial ischemia with regular vigorous exercise. There were no wall motion abnormalities on stress echo which is much more sensitive for myocardial ischemia compared to the ST segment changes observed on the stress echo.    Based on above info pt is felt to be at elevated but acceptable risk to proceed for surgery.  Hold ARB and diuretic on the morning of surgery.      Frank Ruiz MD  PGY3 Anesthesiology

## 2024-07-15 NOTE — PROGRESS NOTES
Pharmacy Medication History Review    Saul Ramirez is a 58 y.o. male admitted for Small bowel obstruction (Multi). Pharmacy reviewed the patient's sclrz-os-zohqfhfnt medications and allergies for accuracy.    The list below reflects the updated PTA list.   Comments regarding how patient may be taking medications differently can be found in the Admit Orders Activity  Prior to Admission Medications   Prescriptions Last Dose Informant Patient Reported?   aspirin 81 mg EC tablet 7/13/2024 Self Yes   Sig: Take 1 tablet (81 mg) by mouth once daily.   atorvastatin (Lipitor) 40 mg tablet 7/13/2024 Self No   Sig: TAKE 1 TABLET BY MOUTH EVERY DAY   cholecalciferol (Vitamin D-3) 25 MCG (1000 UT) tablet 7/14/2024 Self Yes   Sig: Take 1 tablet (1,000 Units) by mouth once daily.   hydroCHLOROthiazide (HYDRODiuril) 12.5 mg tablet 7/14/2024 Self No   Sig: TAKE 1 TABLET BY MOUTH EVERY DAY   Patient taking differently: Take 0.5 tablets (6.25 mg) by mouth once daily.   losartan (Cozaar) 100 mg tablet 7/14/2024 Self No   Sig: TAKE 1 TABLET BY MOUTH EVERY DAY   multivitamin tablet 7/14/2024 Self Yes   Sig: Take 1 tablet by mouth once daily.   sildenafil (Viagra) 100 mg tablet Unknown Self No   Sig: TAKE 1 TABLET DAILY AS NEEDED APPROXIMATELY 1 HOUR BEFORE SEXUAL ACTIVITY      Facility-Administered Medications: None        The list below reflects the updated allergy list. Please review each documented allergy for additional clarification and justification.  Allergies  Reviewed by Bruce Jurado [facility] on 7/15/2024        Severity Reactions Comments    Ace Inhibitors Low Cough     Amoxicillin Low Rash             Patient accepts M2B at discharge.   Local pharmacy: [facility]    Sources:   Pt interview - knows medications well  Dispense hx   O[facility] - no hx   04/01/2024 cardiology office visit    Additional Comments:  Hydrochlorothiazide --> instructed by cardiologist to take 6.25 mg daily. Prescription has not been updated.   "      Bruce Jurado, PharmD  Transitions of Care Pharmacist  07/15/24     Secure Chat preferred   If no response call [phone] or Vocera \"Med Rec\"   "

## 2024-07-15 NOTE — SIGNIFICANT EVENT
Surgical Oncology AM A/P Updates:     No acute events overnight. Exam unchanged from prior.     A/P:   Saul Ramirez is a 58 y.o. male with a past medical history of CAD, HTN, HLD who presented to the ED with a 36 hour history of abdominal pain, nausea, and vomiting. In the ED, his vitals were stable and WNL. Labs were notable for WBC 9.1, T bili 2.1 (T bili has been elevated around this level for the past 5 years). Lipase 66, UA negative. His abdominal exam was benign with only mild khadijah-umbilical tenderness. CT A/P demonstrated dilated loops of small bowel proximal to a 1.5 x 1.7 cm mass in the jejunum. Patient has had no prior abdominal surgeries so this is more likely to represent adenocarcinoma vs. GIST vs. neuroendocrine tumor which will likely need to be removed surgically. Will also likely need to evaluate patient's operative risk given significant CAD (, stress echo with EF 65% however ischemic EKG changes with peak stress).     An NGT was placed in the ED with 300cc bilious output and minimal output since placement. Patient last known bowel movement was 7/14 as well as flatus.       PLAN:  Neuro: Tylenol, prn dilaudid   CV: Continue home ASA; holding the following home meds: atorvastatin 40mg daily, hydrochlorothiazide 12.5mg daily, losartan 100mg daily. Perioperative medicine consult for cardiac risk stratification and discussion of any additional workup prior to surgery given recent stress test findings and asymptomatic bradycardia   Resp: Encourage OOB/IS; CT chest for staging workup   GI: NG to LIWS, PPI, NPO except sips for meds; will discuss operative timing for possible diagnostic laparoscopy and potential resection of mass  : MIVF, monitor and replete electrolytes as clinically indicated   Heme: monitor cbc as clinically indicated   PPX: Lovenox + SCDs     Seen and d/w fellow Dr. Galicia. D/w Dr. Fong.     Ivan Mark MD  PGY-2 General Surgery  Surgical Oncology [id number]

## 2024-07-15 NOTE — CARE PLAN
Problem: Skin  Goal: Decreased wound size/increased tissue granulation at next dressing change  Outcome: Progressing  Goal: Participates in plan/prevention/treatment measures  Outcome: Progressing  Goal: Prevent/manage excess moisture  Outcome: Progressing  Goal: Prevent/minimize sheer/friction injuries  Outcome: Progressing  Goal: Promote/optimize nutrition  Outcome: Progressing  Goal: Promote skin healing  Outcome: Progressing     Problem: Pain  Goal: Takes deep breaths with improved pain control throughout the shift  Outcome: Progressing  Goal: Turns in bed with improved pain control throughout the shift  Outcome: Progressing  Goal: Walks with improved pain control throughout the shift  Outcome: Progressing  Goal: Performs ADL's with improved pain control throughout shift  Outcome: Progressing  Goal: Participates in PT with improved pain control throughout the shift  Outcome: Progressing  Goal: Free from opioid side effects throughout the shift  Outcome: Progressing  Goal: Free from acute confusion related to pain meds throughout the shift  Outcome: Progressing     Problem: Fall/Injury  Goal: Not fall by end of shift  Outcome: Progressing  Goal: Be free from injury by end of the shift  Outcome: Progressing  Goal: Verbalize understanding of personal risk factors for fall in the hospital  Outcome: Progressing  Goal: Verbalize understanding of risk factor reduction measures to prevent injury from fall in the home  Outcome: Progressing  Goal: Use assistive devices by end of the shift  Outcome: Progressing  Goal: Pace activities to prevent fatigue by end of the shift  Outcome: Progressing

## 2024-07-16 ENCOUNTER — ANESTHESIA EVENT (OUTPATIENT)
Dept: OPERATING ROOM | Facility: HOSPITAL | Age: 59
End: 2024-07-16
Payer: COMMERCIAL

## 2024-07-16 PROBLEM — D64.9 ANEMIA: Status: ACTIVE | Noted: 2024-07-16

## 2024-07-16 PROBLEM — I25.10 CAD (CORONARY ARTERY DISEASE): Status: ACTIVE | Noted: 2024-07-16

## 2024-07-16 PROBLEM — K63.89 SMALL BOWEL MASS: Status: ACTIVE | Noted: 2024-07-14

## 2024-07-16 LAB
ALBUMIN SERPL BCP-MCNC: 3.5 G/DL (ref 3.4–5)
ALP SERPL-CCNC: 57 U/L (ref 33–120)
ALT SERPL W P-5'-P-CCNC: 9 U/L (ref 10–52)
ANION GAP SERPL CALC-SCNC: 13 MMOL/L (ref 10–20)
AST SERPL W P-5'-P-CCNC: 13 U/L (ref 9–39)
BILIRUB SERPL-MCNC: 1.6 MG/DL (ref 0–1.2)
BUN SERPL-MCNC: 18 MG/DL (ref 6–23)
CALCIUM SERPL-MCNC: 8.5 MG/DL (ref 8.6–10.6)
CHLORIDE SERPL-SCNC: 103 MMOL/L (ref 98–107)
CO2 SERPL-SCNC: 30 MMOL/L (ref 21–32)
CREAT SERPL-MCNC: 1.19 MG/DL (ref 0.5–1.3)
EGFRCR SERPLBLD CKD-EPI 2021: 71 ML/MIN/1.73M*2
ERYTHROCYTE [DISTWIDTH] IN BLOOD BY AUTOMATED COUNT: 12.2 % (ref 11.5–14.5)
GLUCOSE SERPL-MCNC: 74 MG/DL (ref 74–99)
HCT VFR BLD AUTO: 37.7 % (ref 41–52)
HGB BLD-MCNC: 12.5 G/DL (ref 13.5–17.5)
MAGNESIUM SERPL-MCNC: 2.14 MG/DL (ref 1.6–2.4)
MCH RBC QN AUTO: 29.6 PG (ref 26–34)
MCHC RBC AUTO-ENTMCNC: 33.2 G/DL (ref 32–36)
MCV RBC AUTO: 89 FL (ref 80–100)
NRBC BLD-RTO: 0 /100 WBCS (ref 0–0)
PLATELET # BLD AUTO: 113 X10*3/UL (ref 150–450)
POTASSIUM SERPL-SCNC: 3.6 MMOL/L (ref 3.5–5.3)
PROT SERPL-MCNC: 5.6 G/DL (ref 6.4–8.2)
RBC # BLD AUTO: 4.23 X10*6/UL (ref 4.5–5.9)
SODIUM SERPL-SCNC: 142 MMOL/L (ref 136–145)
WBC # BLD AUTO: 5.4 X10*3/UL (ref 4.4–11.3)

## 2024-07-16 PROCEDURE — 2500000004 HC RX 250 GENERAL PHARMACY W/ HCPCS (ALT 636 FOR OP/ED)

## 2024-07-16 PROCEDURE — 36415 COLL VENOUS BLD VENIPUNCTURE: CPT

## 2024-07-16 PROCEDURE — 85027 COMPLETE CBC AUTOMATED: CPT

## 2024-07-16 PROCEDURE — 2500000001 HC RX 250 WO HCPCS SELF ADMINISTERED DRUGS (ALT 637 FOR MEDICARE OP)

## 2024-07-16 PROCEDURE — 2500000004 HC RX 250 GENERAL PHARMACY W/ HCPCS (ALT 636 FOR OP/ED): Performed by: NURSE PRACTITIONER

## 2024-07-16 PROCEDURE — 1200000003 HC ONCOLOGY  ROOM WITH TELEMETRY DAILY

## 2024-07-16 PROCEDURE — C9113 INJ PANTOPRAZOLE SODIUM, VIA: HCPCS

## 2024-07-16 PROCEDURE — 80053 COMPREHEN METABOLIC PANEL: CPT

## 2024-07-16 PROCEDURE — 83735 ASSAY OF MAGNESIUM: CPT

## 2024-07-16 RX ORDER — ACETAMINOPHEN 10 MG/ML
1000 INJECTION, SOLUTION INTRAVENOUS EVERY 8 HOURS PRN
Status: DISCONTINUED | OUTPATIENT
Start: 2024-07-16 | End: 2024-07-17

## 2024-07-16 RX ORDER — POTASSIUM CHLORIDE 14.9 MG/ML
20 INJECTION INTRAVENOUS
Status: COMPLETED | OUTPATIENT
Start: 2024-07-16 | End: 2024-07-16

## 2024-07-16 ASSESSMENT — COGNITIVE AND FUNCTIONAL STATUS - GENERAL
MOBILITY SCORE: 24
DAILY ACTIVITIY SCORE: 24
DAILY ACTIVITIY SCORE: 24

## 2024-07-16 ASSESSMENT — PAIN SCALES - GENERAL
PAINLEVEL_OUTOF10: 0 - NO PAIN
PAINLEVEL_OUTOF10: 0 - NO PAIN

## 2024-07-16 ASSESSMENT — PAIN - FUNCTIONAL ASSESSMENT
PAIN_FUNCTIONAL_ASSESSMENT: 0-10
PAIN_FUNCTIONAL_ASSESSMENT: 0-10

## 2024-07-16 NOTE — ANESTHESIA PREPROCEDURE EVALUATION
Patient: Saul Ramirez    Procedure Information       Date/Time: 07/17/24 0815    Procedures:       Exploration Laparoscopy, possible Laparotomy, small bowel resection      Exploration Laparotomy      Resection Small Intestine    Location: [facility] OR 01 / [facility]    Surgeons: Jamal Galicia MD        HPI  Saul Ramirez is a 58 y.o. male with a past medical history of CAD, HTN, HLD who presented to the ED with a 36 hour history of abdominal pain, nausea, and vomiting. In the ED, his vitals were stable and WNL. Labs were notable for WBC 9.1, T bili 2.1 (T bili has been elevated around this level for the past 5 years). Lipase 66, UA negative. His abdominal exam was benign with only mild khadijah-umbilical tenderness. CT A/P demonstrated dilated loops of small bowel proximal to a 1.5 x 1.7 cm mass in the jejunum. Patient has had no prior abdominal surgeries so this is more likely to represent adenocarcinoma vs. GIST vs. neuroendocrine tumor which will likely need to be removed surgically. Will also likely need to evaluate patient's operative risk given significant CAD (, stress echo with EF 65% however ischemic EKG changes with peak stress).     An NGT was placed in the ED with 300cc bilious output and minimal output since placement. Patient last known bowel movement was 7/14 as well as flatus.      Relevant Problems   Cardiac   (+) Benign essential hypertension   (+) CAD (coronary artery disease)   (+) Other hyperlipidemia      Hematology   (+) Anemia      Digestive   (+) Small bowel mass   (+) Small bowel obstruction (Multi)   Stress Test: 3/15/24  Summary:   1. ECG changes consistent with ischemia.   2. The resting ejection fraction was estimated at 65% with a peak exercise ejection fraction estimated at >75%.   3. Despite the above described EKG changes, there were no corresponding echocardiographic signs of ischemia.   4. The De Leon score is 6.   5. Study not consistent with exercise induced  diastolic dysfunction.   6. There is no evidence for exercise induced pulmonary hypertension.   7. Abnormal Stress Test.   8. Adequate level of stress achieved.  Clinical information reviewed:    Allergies  Meds             Vitals:    07/16/24 1340   BP: 144/78   Pulse: 60   Resp: 18   Temp: 36.4 °C (97.5 °F)   SpO2: 98%       Past Surgical History:   Procedure Laterality Date   • COLONOSCOPY  01/15/2016    Complete Colonoscopy   • COLONOSCOPY  01/2016    rpt 1-26   • OTHER SURGICAL HISTORY  05/23/2013    Primary Repair Of Ruptured Achilles Tendon     Past Medical History:   Diagnosis Date   • Personal history of other malignant neoplasm of skin     History of squamous cell carcinoma of skin       Current Facility-Administered Medications:   •  acetaminophen (Ofirmev) injection 1,000 mg, 1,000 mg, intravenous, q8h PRN, Ivan Mark MD  •  aspirin chewable tablet 81 mg, 81 mg, oral, Daily, Joseluis Christie MD, 81 mg at 07/16/24 0857  •  benzocaine-menthol (Cepastat Sore Throat) lozenge 1 lozenge, 1 lozenge, Mouth/Throat, q2h PRN, Kenyetta Gonzalez MD, 1 lozenge at 07/15/24 1126  •  enoxaparin (Lovenox) syringe 40 mg, 40 mg, subcutaneous, q24h, Kenyetta Gonzalez MD, 40 mg at 07/16/24 1307  •  HYDROmorphone PF (Dilaudid) injection 0.2 mg, 0.2 mg, intravenous, q4h PRN, Kenyetta Gonzalez MD  •  lactated Ringer's infusion, 75 mL/hr, intravenous, Continuous, Serina Leung, APRN-CNP, Last Rate: 75 mL/hr at 07/16/24 0700, 75 mL/hr at 07/16/24 0700  •  ondansetron (Zofran) injection 4 mg, 4 mg, intravenous, q6h PRN, Kenyetta Gonzalez MD  •  pantoprazole (ProtoNix) injection 40 mg, 40 mg, intravenous, Daily, Ivan Mark MD, 40 mg at 07/16/24 0857  •  phenoL (Chloraseptic) 1.4 % mouth/throat spray 1 spray, 1 spray, Mouth/Throat, q2h PRN, Joseluis Christie MD  •  potassium chloride 20 mEq in sterile water for injection 100 mL, 20 mEq, intravenous, q2h, Serina Leung, APRN-CNP, Last Rate: 50 mL/hr at 07/16/24 1308, 20  "mEq at 07/16/24 1308  Prior to Admission medications    Medication Sig Start Date End Date Taking? Authorizing Provider   cholecalciferol (Vitamin D-3) 25 MCG (1000 UT) tablet Take 1 tablet (1,000 Units) by mouth once daily.   Yes Historical Provider, MD   hydroCHLOROthiazide (HYDRODiuril) 12.5 mg tablet TAKE 1 TABLET BY MOUTH EVERY DAY  Patient taking differently: Take 0.5 tablets (6.25 mg) by mouth once daily. 2/2/24  Yes Robbie So MD   losartan (Cozaar) 100 mg tablet TAKE 1 TABLET BY MOUTH EVERY DAY 2/6/24  Yes Robbie So MD   multivitamin tablet Take 1 tablet by mouth once daily.   Yes Historical Provider, MD   aspirin 81 mg EC tablet Take 1 tablet (81 mg) by mouth once daily.    Historical Provider, MD   atorvastatin (Lipitor) 40 mg tablet TAKE 1 TABLET BY MOUTH EVERY DAY 6/24/24   Robbie So MD   sildenafil (Viagra) 100 mg tablet TAKE 1 TABLET DAILY AS NEEDED APPROXIMATELY 1 HOUR BEFORE SEXUAL ACTIVITY 12/7/23   Robbie So MD     Allergies   Allergen Reactions   • Ace Inhibitors Cough   • Amoxicillin Rash     Social History     Tobacco Use   • Smoking status: Never   • Smokeless tobacco: Never   Substance Use Topics   • Alcohol use: Yes     Alcohol/week: 8.0 standard drinks of alcohol     Types: 2 Glasses of wine, 6 Cans of beer per week         Chemistry    Lab Results   Component Value Date/Time     07/16/2024 0557    K 3.6 07/16/2024 0557     07/16/2024 0557    CO2 30 07/16/2024 0557    BUN 18 07/16/2024 0557    CREATININE 1.19 07/16/2024 0557    Lab Results   Component Value Date/Time    CALCIUM 8.5 (L) 07/16/2024 0557    ALKPHOS 57 07/16/2024 0557    AST 13 07/16/2024 0557    ALT 9 (L) 07/16/2024 0557    BILITOT 1.6 (H) 07/16/2024 0557          Lab Results   Component Value Date/Time    WBC 5.4 07/16/2024 0557    HGB 12.5 (L) 07/16/2024 0557    HCT 37.7 (L) 07/16/2024 0557     (L) 07/16/2024 0557     No results found for: \"PROTIME\", \"PTT\", \"INR\"  Encounter Date: " 07/14/24   ECG 12 lead   Result Value    Ventricular Rate 63    Atrial Rate 63    CO Interval 164    QRS Duration 82    QT Interval 386    QTC Calculation(Bazett) 395    P Axis 44    R Axis 37    T Axis 31    QRS Count 10    Q Onset 216    P Onset 134    P Offset 183    T Offset 409    QTC Fredericia 392    Narrative    Normal sinus rhythm  Cannot rule out Anterior infarct , age undetermined  Abnormal ECG  No previous ECGs available  See ED provider note for full interpretation and clinical correlation  Confirmed by Ricardo Medeiros ([id number]) on 7/15/2024 11:26:53 AM     No results found for this or any previous visit from the past 1095 days.   NPO Detail:  No data recorded     Physical Exam    Airway  Mallampati: II     Cardiovascular - normal exam  Rhythm: regular  Rate: normal     Dental    Pulmonary - normal exam     Abdominal - normal exam         Anesthesia Plan    History of general anesthesia?: yes  History of complications of general anesthesia?: no    ASA 3     general     Anesthetic plan and risks discussed with patient.    Plan discussed with CRNA.

## 2024-07-16 NOTE — PROGRESS NOTES
"Saul Ramirez is a 58 y.o. male on day 1 of admission presenting with Small bowel obstruction (Multi).    Subjective   NAEO. Denies any abdominal pain, nausea, vomiting, chest pain, or shortness of breath. NG remains in place functioning. Passing flatus.     Objective     Constitutional: NAD, A&Ox3   Head/Neck: NCAT  Eyes: Anicteric   Cardiovascular: Regular rate and rhythm per peripheral palpation   Respiratory: Breathing comfortably on RA with symmetric chest rise   Abdominal: Soft, non-distended, mildly tender to deep palpation supraumbilically; NG in place with gastric output   : Deferred   Ext: MAEx4  Psych: Appropriate mood and affect       Last Recorded Vitals  Blood pressure 131/81, pulse 57, temperature 36.5 °C (97.7 °F), temperature source Temporal, resp. rate 18, height 1.803 m (5' 11\"), weight 86.2 kg (190 lb), SpO2 97%.  Intake/Output last 3 Shifts:  I/O last 3 completed shifts:  In: 3103.9 (36 mL/kg) [I.V.:3103.9 (36 mL/kg)]  Out: 800 (9.3 mL/kg) [Urine:150 (0 mL/kg/hr); Emesis/NG output:650]  Weight: 86.2 kg     Relevant Results  No results found for this or any previous visit (from the past 24 hour(s)).  CT chest wo IV contrast  Narrative: Interpreted By:  René Yen and Ohs Zachary   STUDY:  CT CHEST WO IV CONTRAST;  7/15/2024 1:36 pm      INDICATION:  Signs/Symptoms:staging.      COMPARISON:  CT abdomen/pelvis 07/15/2024 CT cardiac scoring 02/27/2024.      ACCESSION NUMBER(S):  [id number]      ORDERING CLINICIAN:  MAXIMO MALONEY      TECHNIQUE:  Helical data acquisition of the chest was obtained without  intravenous contrast. Images were reformatted in axial, coronal, and  sagittal planes.      FINDINGS:  Per EMR, 58-year-old male, never smoker, with history of CAD who  presented to the emergency department with 36 hours of abdominal pain  nausea and vomiting with CT abdomen/pelvis demonstrating small-bowel  mass in the jejunum with multiple dilated loops of small bowel filter  represent " adenocarcinoma versus just versus neuroendocrine tumor. CT  chest without contrast ordered for staging.      LUNGS AND AIRWAYS:  The trachea and central airways are patent. No endobronchial lesion  is seen.      Right basilar atelectasis/scarring, similar to prior exam. No  pneumothorax, pleural effusion or focal consolidation.      MEDIASTINUM AND ALLAN, LOWER NECK AND AXILLA:  The visualized thyroid gland is within normal limits.  No evidence of thoracic lymphadenopathy by CT criteria.  Enteric tube distal tip terminates within the stomach.      HEART AND VESSELS:  The thoracic aorta normal in course and caliber.No significant  calcified atherosclerotic disease of the thoracic aorta. Main  pulmonary artery and its branches are normal in caliber. Moderate  coronary artery calcifications are seen. Please note,the study is not  optimized for evaluation of coronary arteries. The cardiac chambers  are not enlarged. There is no pericardial effusion seen.      UPPER ABDOMEN:  Hyperdense layering material within the gallbladder and bilateral  renal pelvises consistent with vicarious excretion of contrast. Round  renal hypodensities, 1 of which is too small to characterize, the  largest of which represents a simple renal cyst (series 2, image 329).      CHEST WALL AND OSSEOUS STRUCTURES:  Chest wall is within normal limits.  No acute osseous pathology.There are no suspicious osseous  lesions.Mild multilevel degenerative changes within visualized spine.      Impression: 1.  No evidence of thoracic malignancy/metastasis.  2. No evidence acute cardiopulmonary process.      I personally reviewed the images/study and I agree with the findings  as stated by Dr. Taz Koehler. This study was interpreted at  [place], [place].      Signed by: René Yen 7/15/2024 3:19 PM  Dictation workstation:   [id number]  ECG 12 lead  Normal sinus rhythm  Cannot rule out Anterior infarct , age  undetermined  Abnormal ECG  No previous ECGs available  See ED provider note for full interpretation and clinical correlation  Confirmed by Ricardo Medeiros ([id number]) on 7/15/2024 11:26:53 AM  XR chest 1 view  Narrative: Interpreted By:  Lamin Benton and Bartolomei Aguilar Christopher   STUDY:  XR CHEST 1 VIEW;  7/15/2024 3:54 am      INDICATION:  Signs/Symptoms:eval for NG tube placement.      COMPARISON:  None.      ACCESSION NUMBER(S):  [id number]      ORDERING CLINICIAN:  MOHAN DYER      FINDINGS:  AP radiograph of the chest was provided.      Enteric tube coursing below the level of the diaphragm with distal  tip overlying the left hemiabdomen in the expected location of the  gastric fundus/body.      CARDIOMEDIASTINAL SILHOUETTE:  Cardiomediastinal silhouette is normal in size and configuration.      LUNGS:  Patchy bibasilar atelectasis      ABDOMEN:  No remarkable upper abdominal findings.      BONES:  No acute osseous changes.      Impression: 1.  No evidence of acute cardiopulmonary process.  2. Enteric tube as above.      I personally reviewed the images/study and I agree with the findings  as stated by Justyn Welsh MD (Radiology Resident).  This study was interpreted at [facility], [place], [place].      MACRO:  None      Signed by: Lamin Benton 7/15/2024 4:35 AM  Dictation workstation:   [id number]  CT abdomen pelvis w IV contrast  Narrative: Interpreted By:  Josh Reyes and Bartolomei Aguilar Christopher   STUDY:  CT ABDOMEN PELVIS W IV CONTRAST;  7/15/2024 1:00 am      INDICATION:  Signs/Symptoms: Abdominal pain, epigastric.      COMPARISON:  None.      ACCESSION NUMBER(S):  [id number]      ORDERING CLINICIAN:  NGUYEN CRAVEN      TECHNIQUE:  Contiguous axial images of the abdomen and pelvis were obtained after  the intravenous administration of iodinated contrast. Coronal and  sagittal reformatted images were reconstructed from  the axial data.      FINDINGS:  LOWER CHEST: Bibasilar scarring/atelectasis, otherwise no acute  abnormality. Severe coronary artery atherosclerosis.          ABDOMEN/PELVIS:      ABDOMINAL WALL: Tiny fat containing umbilical hernia.      LIVER: No significant parenchymal abnormality.      BILE DUCTS: No significant intrahepatic or extrahepatic dilatation.      GALLBLADDER: No significant abnormality.      PANCREAS: No significant abnormality.      SPLEEN: No significant abnormality.      ADRENALS: No significant abnormality.      KIDNEYS, URETERS, BLADDER: Bilateral simple renal cysts. No  hydroureteronephrosis. Bladder is unremarkable.      REPRODUCTIVE ORGANS: No significant abnormality.      VESSELS: Mild atherosclerosis.      RETROPERITONEUM/LYMPH NODES: No enlarged lymph nodes. No acute  retroperitoneal abnormality.      BOWEL/MESENTERY/PERITONEUM: Stomach is distended with focal area of  narrowing at the junction of the gastric body and antrum (203/26).  There is dilation of jejunal loops measuring up to 3.4 cm (203/40)  with a transition point in the midline (201/116) where there is an  enhancing lesion measuring 1.5 x 1.7 cm.      Small volume free fluid in the pelvis. Mild interloop edema is  present.          MUSCULOSKELETAL: No acute osseous abnormality. No suspicious osseous  lesion.      Impression: 1. Findings suggestive of small-bowel obstruction with dilation of  jejunal loops measuring up to 3.4 cm with a transition point in the  midline pelvis where there is an apparent 1.5 x 1.7 cm enhancing  lesion. If patient has history of prior abdominal surgery, this may  be an adhesion with the enhancement reflecting collapsed bowel loop.  If patient does not have prior abdominal surgery, this may be a small  bowel lesion, most commonly a neuroendocrine tumor. Recommend  surgical consultation.  2. Small volume free fluid in the pelvis and interloop edema, likely  reactive to the above process.  3. Focal  narrowing at the junction of the gastric body and antrum is  favored to represent peristalsis/contraction but underlying lesion  can not be excluded. This can be further evaluated with endoscopy or  monitored on follow-up CTs for small-bowel obstruction.      I personally reviewed the images/study and I agree with the findings  as stated by Justyn Welsh MD (Radiology Resident).  This study was interpreted at [facility], [place], [place].      MACRO:  Josh Reyes discussed the significance and urgency of this critical  finding by telephone with ED physician covering for NGUYEN CRAVEN  on 7/15/2024 at 2:03 am.  (**-RCF-**) Findings:  See findings.      Signed by: Josh Reyes 7/15/2024 2:11 AM  Dictation workstation:   [id number]        Assessment/Plan   Saul Ramirez is a 58 y.o. male with a past medical history of CAD, HTN, HLD who presented to the ED with a 36 hour history of abdominal pain, nausea, and vomiting. In the ED, his vitals were stable and WNL. Labs were notable for WBC 9.1, T bili 2.1 (T bili has been elevated around this level for the past 5 years). Lipase 66, UA negative. His abdominal exam was benign with only mild khadijah-umbilical tenderness. CT A/P demonstrated dilated loops of small bowel proximal to a 1.5 x 1.7 cm mass in the jejunum. Patient has had no prior abdominal surgeries so this is more likely to represent adenocarcinoma vs. GIST vs. neuroendocrine tumor which will likely need to be removed surgically. Will also likely need to evaluate patient's operative risk given significant CAD (, stress echo with EF 65% however ischemic EKG changes with peak stress).     An NGT was placed in the ED with 300cc bilious output and minimal output since placement. Patient last known bowel movement was 7/14 as well as flatus.       PLAN:  Neuro: Tylenol, prn dilaudid   CV: Continue home ASA; holding the following home meds: atorvastatin  "40mg daily, hydrochlorothiazide 12.5mg daily, losartan 100mg daily. Perioperative medicine consult for cardiac risk stratification: No need for additional workup prior to surgery \"RCRI 1, [facility] >4METS, elevated but acceptable risk to proceed with surgery\"   Resp: Encourage OOB/IS; CT chest for staging workup unremarkable   GI: NG to LIWS, PPI, NPO except sips for meds; OR tentatively 7/17 for diagnostic laparoscopy and potential resection of mass and all other indicated procedures  : MIVF, monitor and replete electrolytes as clinically indicated   Heme: monitor cbc as clinically indicated;  and CEA tomorrow    PPX: Lovenox + SCDs      Seen and d/w fellow Dr. Galicia. D/w Dr. Fong.      Ivan Mark MD  PGY-2 General Surgery  Surgical Oncology [id number]    "

## 2024-07-16 NOTE — CARE PLAN
The patient's goals for the shift include      The clinical goals for the shift include patient will remain HDS throughout shift    Problem: Skin  Goal: Decreased wound size/increased tissue granulation at next dressing change  Outcome: Progressing  Goal: Participates in plan/prevention/treatment measures  Outcome: Progressing  Goal: Prevent/manage excess moisture  Outcome: Progressing  Goal: Prevent/minimize sheer/friction injuries  Outcome: Progressing  Goal: Promote/optimize nutrition  Outcome: Progressing  Goal: Promote skin healing  Outcome: Progressing     Problem: Pain  Goal: Takes deep breaths with improved pain control throughout the shift  Outcome: Progressing  Goal: Turns in bed with improved pain control throughout the shift  Outcome: Progressing  Goal: Walks with improved pain control throughout the shift  Outcome: Progressing  Goal: Performs ADL's with improved pain control throughout shift  Outcome: Progressing  Goal: Participates in PT with improved pain control throughout the shift  Outcome: Progressing  Goal: Free from opioid side effects throughout the shift  Outcome: Progressing  Goal: Free from acute confusion related to pain meds throughout the shift  Outcome: Progressing     Problem: Fall/Injury  Goal: Not fall by end of shift  Outcome: Progressing  Goal: Be free from injury by end of the shift  Outcome: Progressing  Goal: Verbalize understanding of personal risk factors for fall in the hospital  Outcome: Progressing  Goal: Verbalize understanding of risk factor reduction measures to prevent injury from fall in the home  Outcome: Progressing  Goal: Use assistive devices by end of the shift  Outcome: Progressing  Goal: Pace activities to prevent fatigue by end of the shift  Outcome: Progressing

## 2024-07-17 ENCOUNTER — ANESTHESIA (OUTPATIENT)
Dept: OPERATING ROOM | Facility: HOSPITAL | Age: 59
End: 2024-07-17
Payer: COMMERCIAL

## 2024-07-17 LAB
ABO GROUP (TYPE) IN BLOOD: NORMAL
ALBUMIN SERPL BCP-MCNC: 4 G/DL (ref 3.4–5)
ALP SERPL-CCNC: 62 U/L (ref 33–120)
ALT SERPL W P-5'-P-CCNC: 10 U/L (ref 10–52)
ANION GAP SERPL CALC-SCNC: 12 MMOL/L (ref 10–20)
ANTIBODY SCREEN: NORMAL
ANTIBODY SCREEN: NORMAL
APTT PPP: 29 SECONDS (ref 27–38)
AST SERPL W P-5'-P-CCNC: 17 U/L (ref 9–39)
BILIRUB DIRECT SERPL-MCNC: 0.3 MG/DL (ref 0–0.3)
BILIRUB SERPL-MCNC: 1.7 MG/DL (ref 0–1.2)
BUN SERPL-MCNC: 16 MG/DL (ref 6–23)
CALCIUM SERPL-MCNC: 9.1 MG/DL (ref 8.6–10.6)
CANCER AG19-9 SERPL-ACNC: 4.91 U/ML
CEA SERPL-MCNC: <0.5 UG/L
CHLORIDE SERPL-SCNC: 100 MMOL/L (ref 98–107)
CO2 SERPL-SCNC: 30 MMOL/L (ref 21–32)
CREAT SERPL-MCNC: 1.03 MG/DL (ref 0.5–1.3)
EGFRCR SERPLBLD CKD-EPI 2021: 84 ML/MIN/1.73M*2
ERYTHROCYTE [DISTWIDTH] IN BLOOD BY AUTOMATED COUNT: 11.8 % (ref 11.5–14.5)
GLUCOSE SERPL-MCNC: 59 MG/DL (ref 74–99)
HCT VFR BLD AUTO: 38.5 % (ref 41–52)
HGB BLD-MCNC: 12.9 G/DL (ref 13.5–17.5)
INR PPP: 1.1 (ref 0.9–1.1)
MAGNESIUM SERPL-MCNC: 2.11 MG/DL (ref 1.6–2.4)
MCH RBC QN AUTO: 29.8 PG (ref 26–34)
MCHC RBC AUTO-ENTMCNC: 33.5 G/DL (ref 32–36)
MCV RBC AUTO: 89 FL (ref 80–100)
NRBC BLD-RTO: 0 /100 WBCS (ref 0–0)
PHOSPHATE SERPL-MCNC: 2.6 MG/DL (ref 2.5–4.9)
PLATELET # BLD AUTO: 142 X10*3/UL (ref 150–450)
POTASSIUM SERPL-SCNC: 3.4 MMOL/L (ref 3.5–5.3)
PROT SERPL-MCNC: 6.5 G/DL (ref 6.4–8.2)
PROTHROMBIN TIME: 12.4 SECONDS (ref 9.8–12.8)
RBC # BLD AUTO: 4.33 X10*6/UL (ref 4.5–5.9)
RH FACTOR (ANTIGEN D): NORMAL
SODIUM SERPL-SCNC: 139 MMOL/L (ref 136–145)
WBC # BLD AUTO: 6.3 X10*3/UL (ref 4.4–11.3)

## 2024-07-17 PROCEDURE — 3700000001 HC GENERAL ANESTHESIA TIME - INITIAL BASE CHARGE: Performed by: SURGERY

## 2024-07-17 PROCEDURE — 2720000007 HC OR 272 NO HCPCS: Performed by: SURGERY

## 2024-07-17 PROCEDURE — 44120 REMOVAL OF SMALL INTESTINE: CPT | Performed by: SURGERY

## 2024-07-17 PROCEDURE — 49320 DIAG LAPARO SEPARATE PROC: CPT | Performed by: SURGERY

## 2024-07-17 PROCEDURE — 0FB04ZX EXCISION OF LIVER, PERCUTANEOUS ENDOSCOPIC APPROACH, DIAGNOSTIC: ICD-10-PCS | Performed by: SURGERY

## 2024-07-17 PROCEDURE — 7100000001 HC RECOVERY ROOM TIME - INITIAL BASE CHARGE: Performed by: SURGERY

## 2024-07-17 PROCEDURE — 47379 UNLISTED LAPS PX LIVER: CPT | Performed by: SURGERY

## 2024-07-17 PROCEDURE — 7100000002 HC RECOVERY ROOM TIME - EACH INCREMENTAL 1 MINUTE: Performed by: SURGERY

## 2024-07-17 PROCEDURE — 2500000004 HC RX 250 GENERAL PHARMACY W/ HCPCS (ALT 636 FOR OP/ED): Performed by: NURSE PRACTITIONER

## 2024-07-17 PROCEDURE — 83735 ASSAY OF MAGNESIUM: CPT

## 2024-07-17 PROCEDURE — 2500000004 HC RX 250 GENERAL PHARMACY W/ HCPCS (ALT 636 FOR OP/ED): Performed by: ANESTHESIOLOGIST ASSISTANT

## 2024-07-17 PROCEDURE — 2500000004 HC RX 250 GENERAL PHARMACY W/ HCPCS (ALT 636 FOR OP/ED): Performed by: ANESTHESIOLOGY

## 2024-07-17 PROCEDURE — 3600000008 HC OR TIME - EACH INCREMENTAL 1 MINUTE - PROCEDURE LEVEL THREE: Performed by: SURGERY

## 2024-07-17 PROCEDURE — 88307 TISSUE EXAM BY PATHOLOGIST: CPT | Mod: TC,SUR | Performed by: SURGERY

## 2024-07-17 PROCEDURE — 86301 IMMUNOASSAY TUMOR CA 19-9: CPT

## 2024-07-17 PROCEDURE — 2500000005 HC RX 250 GENERAL PHARMACY W/O HCPCS: Performed by: SURGERY

## 2024-07-17 PROCEDURE — 80048 BASIC METABOLIC PNL TOTAL CA: CPT

## 2024-07-17 PROCEDURE — 2500000004 HC RX 250 GENERAL PHARMACY W/ HCPCS (ALT 636 FOR OP/ED): Performed by: SURGERY

## 2024-07-17 PROCEDURE — 3700000002 HC GENERAL ANESTHESIA TIME - EACH INCREMENTAL 1 MINUTE: Performed by: SURGERY

## 2024-07-17 PROCEDURE — 84100 ASSAY OF PHOSPHORUS: CPT

## 2024-07-17 PROCEDURE — 84075 ASSAY ALKALINE PHOSPHATASE: CPT

## 2024-07-17 PROCEDURE — 86901 BLOOD TYPING SEROLOGIC RH(D): CPT

## 2024-07-17 PROCEDURE — 3600000003 HC OR TIME - INITIAL BASE CHARGE - PROCEDURE LEVEL THREE: Performed by: SURGERY

## 2024-07-17 PROCEDURE — 82378 CARCINOEMBRYONIC ANTIGEN: CPT

## 2024-07-17 PROCEDURE — 36415 COLL VENOUS BLD VENIPUNCTURE: CPT

## 2024-07-17 PROCEDURE — 0DB84ZZ EXCISION OF SMALL INTESTINE, PERCUTANEOUS ENDOSCOPIC APPROACH: ICD-10-PCS | Performed by: SURGERY

## 2024-07-17 PROCEDURE — 2500000004 HC RX 250 GENERAL PHARMACY W/ HCPCS (ALT 636 FOR OP/ED)

## 2024-07-17 PROCEDURE — 85610 PROTHROMBIN TIME: CPT

## 2024-07-17 PROCEDURE — 2500000004 HC RX 250 GENERAL PHARMACY W/ HCPCS (ALT 636 FOR OP/ED): Mod: JZ | Performed by: SURGERY

## 2024-07-17 PROCEDURE — 86901 BLOOD TYPING SEROLOGIC RH(D): CPT | Performed by: SURGERY

## 2024-07-17 PROCEDURE — 36415 COLL VENOUS BLD VENIPUNCTURE: CPT | Performed by: SURGERY

## 2024-07-17 PROCEDURE — 85027 COMPLETE CBC AUTOMATED: CPT

## 2024-07-17 PROCEDURE — 2500000005 HC RX 250 GENERAL PHARMACY W/O HCPCS: Performed by: ANESTHESIOLOGIST ASSISTANT

## 2024-07-17 PROCEDURE — 1200000003 HC ONCOLOGY  ROOM WITH TELEMETRY DAILY

## 2024-07-17 PROCEDURE — 0DB80ZX EXCISION OF SMALL INTESTINE, OPEN APPROACH, DIAGNOSTIC: ICD-10-PCS | Performed by: SURGERY

## 2024-07-17 RX ORDER — LIDOCAINE HYDROCHLORIDE 10 MG/ML
0.1 INJECTION, SOLUTION EPIDURAL; INFILTRATION; INTRACAUDAL; PERINEURAL ONCE
Status: DISCONTINUED | OUTPATIENT
Start: 2024-07-17 | End: 2024-07-17 | Stop reason: HOSPADM

## 2024-07-17 RX ORDER — HYDROMORPHONE HYDROCHLORIDE 0.2 MG/ML
0.2 INJECTION INTRAMUSCULAR; INTRAVENOUS; SUBCUTANEOUS EVERY 4 HOURS PRN
Status: DISCONTINUED | OUTPATIENT
Start: 2024-07-17 | End: 2024-07-17

## 2024-07-17 RX ORDER — SODIUM CHLORIDE, SODIUM LACTATE, POTASSIUM CHLORIDE, CALCIUM CHLORIDE 600; 310; 30; 20 MG/100ML; MG/100ML; MG/100ML; MG/100ML
100 INJECTION, SOLUTION INTRAVENOUS CONTINUOUS
Status: DISCONTINUED | OUTPATIENT
Start: 2024-07-17 | End: 2024-07-17 | Stop reason: HOSPADM

## 2024-07-17 RX ORDER — SODIUM CHLORIDE 0.9 G/100ML
IRRIGANT IRRIGATION AS NEEDED
Status: DISCONTINUED | OUTPATIENT
Start: 2024-07-17 | End: 2024-07-17 | Stop reason: HOSPADM

## 2024-07-17 RX ORDER — PANTOPRAZOLE SODIUM 40 MG/1
40 TABLET, DELAYED RELEASE ORAL
Status: DISCONTINUED | OUTPATIENT
Start: 2024-07-18 | End: 2024-07-17

## 2024-07-17 RX ORDER — ONDANSETRON HYDROCHLORIDE 2 MG/ML
INJECTION, SOLUTION INTRAVENOUS AS NEEDED
Status: DISCONTINUED | OUTPATIENT
Start: 2024-07-17 | End: 2024-07-17

## 2024-07-17 RX ORDER — OXYCODONE HYDROCHLORIDE 5 MG/1
5 TABLET ORAL EVERY 4 HOURS PRN
Status: DISCONTINUED | OUTPATIENT
Start: 2024-07-17 | End: 2024-07-17 | Stop reason: HOSPADM

## 2024-07-17 RX ORDER — ACETAMINOPHEN 10 MG/ML
1000 INJECTION, SOLUTION INTRAVENOUS EVERY 8 HOURS
Status: DISCONTINUED | OUTPATIENT
Start: 2024-07-17 | End: 2024-07-19

## 2024-07-17 RX ORDER — CEFAZOLIN SODIUM 2 G/100ML
2 INJECTION, SOLUTION INTRAVENOUS EVERY 8 HOURS
Status: DISCONTINUED | OUTPATIENT
Start: 2024-07-17 | End: 2024-07-17 | Stop reason: HOSPADM

## 2024-07-17 RX ORDER — ROCURONIUM BROMIDE 10 MG/ML
INJECTION, SOLUTION INTRAVENOUS AS NEEDED
Status: DISCONTINUED | OUTPATIENT
Start: 2024-07-17 | End: 2024-07-17

## 2024-07-17 RX ORDER — HYDROXYZINE PAMOATE 25 MG/1
25 CAPSULE ORAL EVERY 6 HOURS PRN
Status: DISCONTINUED | OUTPATIENT
Start: 2024-07-17 | End: 2024-07-19 | Stop reason: HOSPADM

## 2024-07-17 RX ORDER — ACETAMINOPHEN 650 MG/1
650 SUPPOSITORY RECTAL EVERY 6 HOURS
Status: DISCONTINUED | OUTPATIENT
Start: 2024-07-17 | End: 2024-07-17

## 2024-07-17 RX ORDER — ETOMIDATE 2 MG/ML
INJECTION INTRAVENOUS AS NEEDED
Status: DISCONTINUED | OUTPATIENT
Start: 2024-07-17 | End: 2024-07-17

## 2024-07-17 RX ORDER — MEPERIDINE HYDROCHLORIDE 25 MG/ML
12.5 INJECTION INTRAMUSCULAR; INTRAVENOUS; SUBCUTANEOUS EVERY 10 MIN PRN
Status: DISCONTINUED | OUTPATIENT
Start: 2024-07-17 | End: 2024-07-17 | Stop reason: HOSPADM

## 2024-07-17 RX ORDER — OXYCODONE HCL 5 MG/5 ML
5 SOLUTION, ORAL ORAL EVERY 4 HOURS PRN
Status: DISCONTINUED | OUTPATIENT
Start: 2024-07-17 | End: 2024-07-18

## 2024-07-17 RX ORDER — ESMOLOL HYDROCHLORIDE 10 MG/ML
INJECTION INTRAVENOUS AS NEEDED
Status: DISCONTINUED | OUTPATIENT
Start: 2024-07-17 | End: 2024-07-17

## 2024-07-17 RX ORDER — METRONIDAZOLE 500 MG/100ML
500 INJECTION, SOLUTION INTRAVENOUS EVERY 8 HOURS
Status: DISCONTINUED | OUTPATIENT
Start: 2024-07-17 | End: 2024-07-17 | Stop reason: HOSPADM

## 2024-07-17 RX ORDER — SODIUM CHLORIDE, SODIUM LACTATE, POTASSIUM CHLORIDE, CALCIUM CHLORIDE 600; 310; 30; 20 MG/100ML; MG/100ML; MG/100ML; MG/100ML
30 INJECTION, SOLUTION INTRAVENOUS CONTINUOUS
Status: DISCONTINUED | OUTPATIENT
Start: 2024-07-17 | End: 2024-07-19

## 2024-07-17 RX ORDER — HYDROMORPHONE HYDROCHLORIDE 0.2 MG/ML
0.2 INJECTION INTRAMUSCULAR; INTRAVENOUS; SUBCUTANEOUS EVERY 4 HOURS PRN
Status: DISCONTINUED | OUTPATIENT
Start: 2024-07-17 | End: 2024-07-19

## 2024-07-17 RX ORDER — HYDROMORPHONE HYDROCHLORIDE 1 MG/ML
0.5 INJECTION, SOLUTION INTRAMUSCULAR; INTRAVENOUS; SUBCUTANEOUS EVERY 4 HOURS PRN
Status: DISCONTINUED | OUTPATIENT
Start: 2024-07-17 | End: 2024-07-17

## 2024-07-17 RX ORDER — HYDROMORPHONE HYDROCHLORIDE 1 MG/ML
0.4 INJECTION, SOLUTION INTRAMUSCULAR; INTRAVENOUS; SUBCUTANEOUS EVERY 4 HOURS PRN
Status: DISCONTINUED | OUTPATIENT
Start: 2024-07-17 | End: 2024-07-19

## 2024-07-17 RX ORDER — HYDROMORPHONE HYDROCHLORIDE 1 MG/ML
0.2 INJECTION, SOLUTION INTRAMUSCULAR; INTRAVENOUS; SUBCUTANEOUS EVERY 5 MIN PRN
Status: DISCONTINUED | OUTPATIENT
Start: 2024-07-17 | End: 2024-07-17 | Stop reason: HOSPADM

## 2024-07-17 RX ORDER — HYDROMORPHONE HYDROCHLORIDE 1 MG/ML
0.5 INJECTION, SOLUTION INTRAMUSCULAR; INTRAVENOUS; SUBCUTANEOUS EVERY 5 MIN PRN
Status: DISCONTINUED | OUTPATIENT
Start: 2024-07-17 | End: 2024-07-17 | Stop reason: HOSPADM

## 2024-07-17 RX ORDER — LIDOCAINE HYDROCHLORIDE 20 MG/ML
INJECTION, SOLUTION INFILTRATION; PERINEURAL AS NEEDED
Status: DISCONTINUED | OUTPATIENT
Start: 2024-07-17 | End: 2024-07-17

## 2024-07-17 RX ORDER — MIDAZOLAM HYDROCHLORIDE 1 MG/ML
INJECTION INTRAMUSCULAR; INTRAVENOUS AS NEEDED
Status: DISCONTINUED | OUTPATIENT
Start: 2024-07-17 | End: 2024-07-17

## 2024-07-17 RX ORDER — ALBUTEROL SULFATE 0.83 MG/ML
2.5 SOLUTION RESPIRATORY (INHALATION) ONCE AS NEEDED
Status: DISCONTINUED | OUTPATIENT
Start: 2024-07-17 | End: 2024-07-17 | Stop reason: HOSPADM

## 2024-07-17 RX ORDER — ACETAMINOPHEN 160 MG/5ML
650 SOLUTION ORAL EVERY 6 HOURS
Status: DISCONTINUED | OUTPATIENT
Start: 2024-07-17 | End: 2024-07-17

## 2024-07-17 RX ORDER — CEFAZOLIN 1 G/1
INJECTION, POWDER, FOR SOLUTION INTRAVENOUS AS NEEDED
Status: DISCONTINUED | OUTPATIENT
Start: 2024-07-17 | End: 2024-07-17

## 2024-07-17 RX ORDER — NALOXONE HYDROCHLORIDE 0.4 MG/ML
0.2 INJECTION, SOLUTION INTRAMUSCULAR; INTRAVENOUS; SUBCUTANEOUS EVERY 5 MIN PRN
Status: DISCONTINUED | OUTPATIENT
Start: 2024-07-17 | End: 2024-07-17

## 2024-07-17 RX ORDER — ONDANSETRON HYDROCHLORIDE 2 MG/ML
4 INJECTION, SOLUTION INTRAVENOUS EVERY 8 HOURS PRN
Status: DISCONTINUED | OUTPATIENT
Start: 2024-07-17 | End: 2024-07-17

## 2024-07-17 RX ORDER — DROPERIDOL 2.5 MG/ML
0.62 INJECTION, SOLUTION INTRAMUSCULAR; INTRAVENOUS ONCE AS NEEDED
Status: DISCONTINUED | OUTPATIENT
Start: 2024-07-17 | End: 2024-07-17 | Stop reason: HOSPADM

## 2024-07-17 RX ORDER — FENTANYL CITRATE 50 UG/ML
INJECTION, SOLUTION INTRAMUSCULAR; INTRAVENOUS AS NEEDED
Status: DISCONTINUED | OUTPATIENT
Start: 2024-07-17 | End: 2024-07-17

## 2024-07-17 RX ORDER — PROPOFOL 10 MG/ML
INJECTION, EMULSION INTRAVENOUS AS NEEDED
Status: DISCONTINUED | OUTPATIENT
Start: 2024-07-17 | End: 2024-07-17

## 2024-07-17 RX ORDER — POLYETHYLENE GLYCOL 3350 17 G/17G
17 POWDER, FOR SOLUTION ORAL DAILY
Status: DISCONTINUED | OUTPATIENT
Start: 2024-07-17 | End: 2024-07-19

## 2024-07-17 RX ORDER — CEFAZOLIN SODIUM 2 G/100ML
2 INJECTION, SOLUTION INTRAVENOUS EVERY 8 HOURS
Status: COMPLETED | OUTPATIENT
Start: 2024-07-17 | End: 2024-07-18

## 2024-07-17 RX ORDER — ENOXAPARIN SODIUM 100 MG/ML
40 INJECTION SUBCUTANEOUS DAILY
Status: DISCONTINUED | OUTPATIENT
Start: 2024-07-17 | End: 2024-07-19 | Stop reason: HOSPADM

## 2024-07-17 RX ORDER — KETOROLAC TROMETHAMINE 15 MG/ML
15 INJECTION, SOLUTION INTRAMUSCULAR; INTRAVENOUS EVERY 6 HOURS SCHEDULED
Status: DISCONTINUED | OUTPATIENT
Start: 2024-07-18 | End: 2024-07-19 | Stop reason: HOSPADM

## 2024-07-17 RX ORDER — PANTOPRAZOLE SODIUM 40 MG/10ML
40 INJECTION, POWDER, LYOPHILIZED, FOR SOLUTION INTRAVENOUS
Status: DISCONTINUED | OUTPATIENT
Start: 2024-07-18 | End: 2024-07-17

## 2024-07-17 RX ORDER — LABETALOL HYDROCHLORIDE 5 MG/ML
5 INJECTION, SOLUTION INTRAVENOUS ONCE AS NEEDED
Status: DISCONTINUED | OUTPATIENT
Start: 2024-07-17 | End: 2024-07-17 | Stop reason: HOSPADM

## 2024-07-17 RX ORDER — ONDANSETRON 4 MG/1
4 TABLET, FILM COATED ORAL EVERY 8 HOURS PRN
Status: DISCONTINUED | OUTPATIENT
Start: 2024-07-17 | End: 2024-07-17

## 2024-07-17 RX ORDER — ACETAMINOPHEN 325 MG/1
650 TABLET ORAL EVERY 6 HOURS
Status: DISCONTINUED | OUTPATIENT
Start: 2024-07-17 | End: 2024-07-17

## 2024-07-17 RX ORDER — METHOCARBAMOL 100 MG/ML
1000 INJECTION, SOLUTION INTRAMUSCULAR; INTRAVENOUS ONCE
Status: COMPLETED | OUTPATIENT
Start: 2024-07-17 | End: 2024-07-17

## 2024-07-17 RX ORDER — MIDAZOLAM HYDROCHLORIDE 1 MG/ML
1 INJECTION INTRAMUSCULAR; INTRAVENOUS ONCE AS NEEDED
Status: DISCONTINUED | OUTPATIENT
Start: 2024-07-17 | End: 2024-07-17 | Stop reason: HOSPADM

## 2024-07-17 RX ORDER — HEPARIN SODIUM 5000 [USP'U]/ML
5000 INJECTION, SOLUTION INTRAVENOUS; SUBCUTANEOUS ONCE
Status: COMPLETED | OUTPATIENT
Start: 2024-07-17 | End: 2024-07-17

## 2024-07-17 RX ORDER — BUPIVACAINE HYDROCHLORIDE 5 MG/ML
INJECTION, SOLUTION PERINEURAL AS NEEDED
Status: DISCONTINUED | OUTPATIENT
Start: 2024-07-17 | End: 2024-07-17 | Stop reason: HOSPADM

## 2024-07-17 ASSESSMENT — PAIN - FUNCTIONAL ASSESSMENT
PAIN_FUNCTIONAL_ASSESSMENT: 0-10
PAIN_FUNCTIONAL_ASSESSMENT: UNABLE TO SELF-REPORT
PAIN_FUNCTIONAL_ASSESSMENT: 0-10
PAIN_FUNCTIONAL_ASSESSMENT: UNABLE TO SELF-REPORT
PAIN_FUNCTIONAL_ASSESSMENT: 0-10
PAIN_FUNCTIONAL_ASSESSMENT: 0-10
PAIN_FUNCTIONAL_ASSESSMENT: UNABLE TO SELF-REPORT
PAIN_FUNCTIONAL_ASSESSMENT: 0-10
PAIN_FUNCTIONAL_ASSESSMENT: 0-10
PAIN_FUNCTIONAL_ASSESSMENT: UNABLE TO SELF-REPORT
PAIN_FUNCTIONAL_ASSESSMENT: 0-10

## 2024-07-17 ASSESSMENT — COGNITIVE AND FUNCTIONAL STATUS - GENERAL
MOBILITY SCORE: 24
DAILY ACTIVITIY SCORE: 24

## 2024-07-17 ASSESSMENT — PAIN SCALES - GENERAL
PAINLEVEL_OUTOF10: 8
PAINLEVEL_OUTOF10: 7
PAINLEVEL_OUTOF10: 7
PAIN_LEVEL: 2
PAINLEVEL_OUTOF10: 0 - NO PAIN
PAINLEVEL_OUTOF10: 7
PAINLEVEL_OUTOF10: 5 - MODERATE PAIN
PAINLEVEL_OUTOF10: 6
PAINLEVEL_OUTOF10: 7
PAINLEVEL_OUTOF10: 5 - MODERATE PAIN
PAINLEVEL_OUTOF10: 7
PAINLEVEL_OUTOF10: 7

## 2024-07-17 ASSESSMENT — COLUMBIA-SUICIDE SEVERITY RATING SCALE - C-SSRS
2. HAVE YOU ACTUALLY HAD ANY THOUGHTS OF KILLING YOURSELF?: NO
1. IN THE PAST MONTH, HAVE YOU WISHED YOU WERE DEAD OR WISHED YOU COULD GO TO SLEEP AND NOT WAKE UP?: NO
6. HAVE YOU EVER DONE ANYTHING, STARTED TO DO ANYTHING, OR PREPARED TO DO ANYTHING TO END YOUR LIFE?: NO

## 2024-07-17 ASSESSMENT — PAIN DESCRIPTION - LOCATION
LOCATION: ABDOMEN

## 2024-07-17 NOTE — ANESTHESIA PROCEDURE NOTES
Peripheral IV  Date/Time: 7/17/2024 8:37 AM      Placement  Needle size: 18 G  Laterality: left  Location: antecubital  Site prep: alcohol  Technique: anatomical landmarks  Attempts: 1

## 2024-07-17 NOTE — SIGNIFICANT EVENT
"Post Op-Check    Subjective:   Saul Ramirez is a 58 y.o. year old male POD 0 from Dx lap, SBR with anastomosis. Patient denies any fevers, chills, nausea, vomiting, chest pain or shortness of breath. Patient endorses pain is well controlled with current medications.     Objective:   /71   Pulse 71   Temp 36.8 °C (98.2 °F)   Resp 16   Ht 1.803 m (5' 11\")   Wt 86 kg (189 lb 9.5 oz)   SpO2 98%   BMI 26.44 kg/m²     Constitutional: NAD, A&Ox3   Head/Neck: NCAT  Eyes: Anicteric   Cardiovascular: Regular rate and rhythm per peripheral palpation   Respiratory: Breathing comfortably on NC with symmetric chest rise   Abdominal: Soft, appropriately tender to palpation, incisions C/D/I with dermabond. NG in place with gastric output   : Chaudhari in place   Ext: MAEx4  Psych: Appropriate mood and affect     Assessment/Plan:   Saul Ramirez is a 58 y.o. year old male POD 0 from dx lap, SBR and liver biopsy. Recovering well from procedure.      Plan:  -Multimodal pain control  -MIVF   -Maintain chaudhari   -NPO except sips with meds, maintain NG to ISHA Mark MD  PGY-1 General Surgery   Surg Onc [id number]    "

## 2024-07-17 NOTE — SIGNIFICANT EVENT
1250- writer asked Dr. Galicia if it was ok if pt transported to floor without sx, Dr. Galicia states pt is fine to transport to floor without sx

## 2024-07-17 NOTE — SIGNIFICANT EVENT
1147- notified Dr. Osorio that PACU orders aren't in, states it may take some time for him to put in orders, notified him that pt is in pain, states to give pt 0.5 mg dilaudid and he will put in orders in a moment

## 2024-07-17 NOTE — CARE PLAN
The patient's goals for the shift include      The clinical goals for the shift include Pt will remain HDS throughout shift    Problem: Skin  Goal: Decreased wound size/increased tissue granulation at next dressing change  Outcome: Progressing  Goal: Participates in plan/prevention/treatment measures  Outcome: Progressing  Goal: Prevent/manage excess moisture  Outcome: Progressing  Goal: Prevent/minimize sheer/friction injuries  Outcome: Progressing  Goal: Promote/optimize nutrition  Outcome: Progressing  Goal: Promote skin healing  Outcome: Progressing     Problem: Pain  Goal: Takes deep breaths with improved pain control throughout the shift  Outcome: Progressing  Goal: Turns in bed with improved pain control throughout the shift  Outcome: Progressing  Goal: Walks with improved pain control throughout the shift  Outcome: Progressing  Goal: Performs ADL's with improved pain control throughout shift  Outcome: Progressing  Goal: Participates in PT with improved pain control throughout the shift  Outcome: Progressing  Goal: Free from opioid side effects throughout the shift  Outcome: Progressing  Goal: Free from acute confusion related to pain meds throughout the shift  Outcome: Progressing     Problem: Fall/Injury  Goal: Not fall by end of shift  Outcome: Progressing  Goal: Be free from injury by end of the shift  Outcome: Progressing  Goal: Verbalize understanding of personal risk factors for fall in the hospital  Outcome: Progressing  Goal: Verbalize understanding of risk factor reduction measures to prevent injury from fall in the home  Outcome: Progressing  Goal: Use assistive devices by end of the shift  Outcome: Progressing  Goal: Pace activities to prevent fatigue by end of the shift  Outcome: Progressing

## 2024-07-17 NOTE — OP NOTE
Exploration Laparoscopy, Laparotomy, small bowel resection, Exploration Laparotomy, Resection Small Intestine Operative Note     Date: 2024  OR Location: [facility] OR    Name: Saul Ramirez, : 1965, Age: 58 y.o., MRN: [id number], Sex: male    Diagnosis  Pre-op Diagnosis      * Small bowel obstruction (Multi) [K56.609]     * Small bowel mass [K63.89] Post-op Diagnosis     * Small bowel obstruction (Multi) [K56.609]     * Small bowel mass [K63.89]     Procedures  Exploration Laparoscopy, Laparotomy, small bowel resection  69676 - AK LAPS ABD PRTM&OMENTUM DX W/WO SPEC BR/WA SPX    Exploration Laparotomy  99925 - AK EXPLORATORY LAPAROTOMY CELIOTOMY W/WO BIOPSY SPX    Exploration Laparotomy  16206 - AK EXPLORATORY LAPAROTOMY CELIOTOMY W/WO BIOPSY SPX    Resection Small Intestine  59806 - AK ENTRC RESCJ SMALL INTESTINE 1 RESCJ & ANAST    Biopsy Laparoscopy Liver  55412 - AK UNLISTED LAPAROSCOPIC PROCEDURE LIVER      Surgeons      * Jamal Galicia - Primary    Resident/Fellow/Other Assistant:  Shannon Iniguez MS4 PhD    Procedure Summary  Anesthesia: General  ASA: III  Anesthesia Staff: Anesthesiologist: Valdo Roth MD  C-AA: JOANNA Hogan    Estimated Blood Loss: 20mL    Intra-op Medications:   Administrations occurring from 0815 to 1000 on 24:   Medication Name Total Dose   metroNIDAZOLE (Flagyl) 500 mg in sodium chloride (iso)  mL 500 mg   Ancef 2gm           Anesthesia Record               Intraprocedure I/O Totals          Intake    LR bolus 500.00 mL    metroNIDAZOLE (Flagyl) 500 mg in sodium chloride (iso)  mL 100.00 mL    Total Intake 600 mL       Output    Est. Blood Loss 20 mL    Total Output 20 mL       Net    Net Volume 580 mL          Specimen:   ID Type Source Tests Collected by Time   1 : SEGMENT 6 LIVER BIOPSY Tissue LIVER BIOPSY RIGHT SURGICAL PATHOLOGY EXAM Jamal Galicia MD 2024 0998   2 : PORTION OF SMALL BOWEL Tissue SMALL BOWEL /INTESTINE  SEGMENTAL RESECTION SURGICAL PATHOLOGY EXAM Jamal Galicia MD 7/17/2024 1003        Staff:   Circulator: Helena  Scrub Person: Maik  Circulator: Ai  Scrub Person: Cong Toribio Circulator: Cherri         Drains and/or Catheters:   NG/OG/Feeding Tube Right nostril (Active)   Tube Status Low intermittent suction 07/17/24 0708   Placement Verification Measurements 07/16/24 2230   Distal Tube Measurement 60 cm 07/16/24 2230   Site Assessment Clean;Dry;Intact 07/17/24 0708   Drainage Appearance Green 07/17/24 0708   NG/OG Interventions Air injected into blue air vent port 07/16/24 0043   Tube Securement Taped to nostril center 07/16/24 2230   Output (mL) 200 mL 07/17/24 0457       Urethral Catheter Non-latex 16 Fr. (Active)       Tourniquet Times:         Implants: none    Findings: Small bowel mass approximately two thirds of the way towards the ileocecal valve, proximal ileum/distal jejunum.  Entire bowel run visually and manually with no evidence of metastatic or synchronous lesions.  No palpable lymphadenopathy.  Small bowel resection performed with sequential firings of the white load 60 cm stapler with a gray load stapler across the mesenteric base.  Mesenteric defect closed with 3 -0 V-Loc suture.  Side-to-side functional end and 60 cm anastomosis made with primary closure of common enterotomy in multiple layers.  Small segment 6/5 benign appearing liver lesion negative for malignancy on frozen section.        Indications: Saul Ramirez is an 58 y.o. male who is having surgery for Small bowel obstruction (Multi) [K56.609]  Small bowel mass [K63.89].     The patient was seen in the preoperative area. The risks, benefits, complications, treatment options, non-operative alternatives, expected recovery and outcomes were discussed with the patient. The possibilities of reaction to medication, pulmonary aspiration, injury to surrounding structures, bleeding, recurrent infection, the need for additional  procedures, failure to diagnose a condition, and creating a complication requiring transfusion or operation were discussed with the patient. The patient concurred with the proposed plan, giving informed consent.  The site of surgery was properly noted/marked if necessary per policy. The patient has been actively warmed in preoperative area. Preoperative antibiotics have been ordered and given within 1 hours of incision. Venous thrombosis prophylaxis have been ordered including bilateral sequential compression devices and chemical prophylaxis    Procedure Details:     The patient was taken to the operating theater where he was placed in the supine position and intubated successfully by anesthesia.  The left arm was tucked and the right arm was out.  A Mott catheter was placed.  The abdomen was prepped and draped in the usual sterile fashion.  A timeout was performed verifying the patient procedure site and all safety measures were followed.    Following this the patient's umbilical hernia was identified and a small curvilinear incision was made around the umbilicus and dissected down to the fascial defect.  The fascial defect was utilized to enter the peritoneum and the abdomen inspected.  There were no apparent injuries from the entry maneuver.  A 5 mm port was utilized to insufflate the abdomen to 15 mmHg.  The entire abdomen was inspected and there is no evidence of metastatic disease.  2 additional 5 mm ports were placed in the left lower quadrant and the right upper quadrant and the umbilical port site was upsized to a 10 mm port.      There was a small benign-appearing segment 6 liver lesion that was removed for frozen section.      Once this lesion had been sent for frozen section in the area it cauterized for hemostasis, be small bowel was run starting at the ligament of Treitz using atraumatic bowel graspers approximately two thirds down the length of the bowel a mass was encountered with dilated proximal  small bowel and decompressed distal small bowel.  The remainder of the small bowel was run to the ileocecal valve with no evidence of synchronous lesions.    At this point the decision was made to make a HandPort so the periumbilical incision was extended cephalad and caudad and a wound protector device was placed.  The small bowel was able to be easily eviscerated from the abdomen and again the entire small bowel was run manually to and palpated thoroughly to ensure there is no synchronous lesions.  Additionally the small bowel mesentery was palpated and there did not appear to be any pathologic lymphadenopathy approximately.    Once the bowel had been inspected completely the lesion was again identified and a 5 cm jazz proximal and distal to the lesion was made.  The bowel was then transected with sequential firings of the 60 cm stapler utilizing white loads.  The mesentery was then transected with additional firings of the white load and a cone formation in order to ensure an adequate lymph node harvest.  Once the mesentery had been coned down closer to the main vascular pedicle the base was transected with a gray load stapler.  The mesenteric staple lines were inspected for hemostasis and found to be adequate.  The mesenteric defect was then closed using a running 3 0 V-Loc suture.    The 2 ends of the small bowel were juxtaposed in an antiperistaltic fashion and 3-0 silk sutures were used to place a posterior layer of the anastomosis.  The end of the staple line was then excised and a 60 cm stapler inserted and fired creating a side-to-side functional end-to-end 60 cm anastomosis.  The common enterotomy was then oversewed using a 3 0 V-Loc suture in 2 layers additionally the staple lines were oversewn with 3-0 silk.  We then changed gloves and passed off all the dirty instruments.    Once this had been accomplished the bowel was returned to the abdomen and the HandPort reattached.  The abdomen was reinspected  and the bowel was verified not to be twisted.   the bowel at the anastomosis was pink and viable and there was no evidence of hemorrhage.    After verifying all counts were correct the periumbilical incision fascia was closed using a #1 stratafix.  The wound irrigated with saline and closed with 3-0 Vicryl and 4-0 Monocryl.  The 5 mm port sites were closed with 4-0 Monocryl.  Dermabond was applied to all the incisions.  All counts were reported as correct including needle instrument sponge count.  There were no apparent complications.  The patient tolerated procedure well.  Complications:  None; patient tolerated the procedure well.    Disposition: PACU - hemodynamically stable.  Condition: stable           Attending Attestation: I was present and scrubbed for the entire procedure.    Jamal Galicia  Phone Number: [phone]

## 2024-07-17 NOTE — ANESTHESIA PROCEDURE NOTES
Airway  Date/Time: 7/17/2024 8:37 AM  Urgency: elective    Airway not difficult    Staffing  Performed: JOSE   Authorized by: Valdo Roth MD    Performed by: JOANNA Hogan  Patient location during procedure: OR    Indications and Patient Condition  Indications for airway management: anesthesia and airway protection  Spontaneous Ventilation: absent  Sedation level: deep  Preoxygenated: yes  Patient position: sniffing  Mask difficulty assessment: 1 - vent by mask    Final Airway Details  Final airway type: endotracheal airway      Successful airway: ETT  Cuffed: yes   Successful intubation technique: video laryngoscopy (higuera mac 4)  Facilitating devices/methods: intubating stylet and cricoid pressure  Endotracheal tube insertion site: oral  Blade: Nasra  Blade size: #4  ETT size (mm): 7.5  Cormack-Lehane Classification: grade I - full view of glottis  Placement verified by: chest auscultation and capnometry   Measured from: gums  Number of attempts at approach: 1  Ventilation between attempts: none  Number of other approaches attempted: 0

## 2024-07-17 NOTE — ANESTHESIA POSTPROCEDURE EVALUATION
Patient: Saul Ramirez    Procedure Summary       Date: 07/17/24 Room / Location: [facility] OR 01 / [facility]    Anesthesia Start: 0819 Anesthesia Stop: 1140    Procedures:       Exploration Laparoscopy, Laparotomy, small bowel resection      Exploration Laparotomy      Resection Small Intestine      Biopsy Laparoscopy Liver (Right) Diagnosis:       Small bowel obstruction (Multi)      Small bowel mass      (Small bowel obstruction (Multi) [K56.609])      (Small bowel mass [K63.89])    Surgeons: Jamal Galicia MD Responsible Provider: Valdo Roth MD    Anesthesia Type: general ASA Status: 3            Anesthesia Type: general    Vitals Value Taken Time   /73 07/17/24 1200   Temp 36 °C (96.8 °F) 07/17/24 1134   Pulse 59 07/17/24 1208   Resp 7 07/17/24 1208   SpO2 100 % 07/17/24 1208   Vitals shown include unfiled device data.    Anesthesia Post Evaluation    Patient location during evaluation: PACU  Patient participation: complete - patient participated  Level of consciousness: awake and alert  Pain score: 2  Pain management: adequate  Airway patency: patent  Cardiovascular status: acceptable  Respiratory status: acceptable  Hydration status: acceptable  Postoperative Nausea and Vomiting: none    There were no known notable events for this encounter.

## 2024-07-18 LAB
ALBUMIN SERPL BCP-MCNC: 3.8 G/DL (ref 3.4–5)
ALP SERPL-CCNC: 56 U/L (ref 33–120)
ALT SERPL W P-5'-P-CCNC: 10 U/L (ref 10–52)
ANION GAP SERPL CALC-SCNC: 16 MMOL/L (ref 10–20)
AST SERPL W P-5'-P-CCNC: 21 U/L (ref 9–39)
ATRIAL RATE: 51 BPM
BILIRUB SERPL-MCNC: 1.1 MG/DL (ref 0–1.2)
BUN SERPL-MCNC: 16 MG/DL (ref 6–23)
CALCIUM SERPL-MCNC: 8.6 MG/DL (ref 8.6–10.6)
CHLORIDE SERPL-SCNC: 101 MMOL/L (ref 98–107)
CO2 SERPL-SCNC: 26 MMOL/L (ref 21–32)
CREAT SERPL-MCNC: 1.01 MG/DL (ref 0.5–1.3)
EGFRCR SERPLBLD CKD-EPI 2021: 86 ML/MIN/1.73M*2
ERYTHROCYTE [DISTWIDTH] IN BLOOD BY AUTOMATED COUNT: 11.8 % (ref 11.5–14.5)
GLUCOSE BLD MANUAL STRIP-MCNC: 131 MG/DL (ref 74–99)
GLUCOSE SERPL-MCNC: 70 MG/DL (ref 74–99)
HCT VFR BLD AUTO: 38.7 % (ref 41–52)
HGB BLD-MCNC: 12.7 G/DL (ref 13.5–17.5)
MAGNESIUM SERPL-MCNC: 2.51 MG/DL (ref 1.6–2.4)
MCH RBC QN AUTO: 30.1 PG (ref 26–34)
MCHC RBC AUTO-ENTMCNC: 32.8 G/DL (ref 32–36)
MCV RBC AUTO: 92 FL (ref 80–100)
NRBC BLD-RTO: 0 /100 WBCS (ref 0–0)
P AXIS: 41 DEGREES
P OFFSET: 184 MS
P ONSET: 131 MS
PLATELET # BLD AUTO: 127 X10*3/UL (ref 150–450)
POTASSIUM SERPL-SCNC: 3.9 MMOL/L (ref 3.5–5.3)
PR INTERVAL: 174 MS
PROT SERPL-MCNC: 6.1 G/DL (ref 6.4–8.2)
Q ONSET: 218 MS
QRS COUNT: 9 BEATS
QRS DURATION: 88 MS
QT INTERVAL: 422 MS
QTC CALCULATION(BAZETT): 388 MS
QTC FREDERICIA: 400 MS
R AXIS: 15 DEGREES
RBC # BLD AUTO: 4.22 X10*6/UL (ref 4.5–5.9)
SODIUM SERPL-SCNC: 139 MMOL/L (ref 136–145)
T AXIS: 21 DEGREES
T OFFSET: 429 MS
VENTRICULAR RATE: 51 BPM
WBC # BLD AUTO: 6.4 X10*3/UL (ref 4.4–11.3)

## 2024-07-18 PROCEDURE — 2500000004 HC RX 250 GENERAL PHARMACY W/ HCPCS (ALT 636 FOR OP/ED): Performed by: SURGERY

## 2024-07-18 PROCEDURE — 99232 SBSQ HOSP IP/OBS MODERATE 35: CPT | Performed by: SURGERY

## 2024-07-18 PROCEDURE — 2500000004 HC RX 250 GENERAL PHARMACY W/ HCPCS (ALT 636 FOR OP/ED)

## 2024-07-18 PROCEDURE — C9113 INJ PANTOPRAZOLE SODIUM, VIA: HCPCS | Performed by: SURGERY

## 2024-07-18 PROCEDURE — 97162 PT EVAL MOD COMPLEX 30 MIN: CPT | Mod: GP | Performed by: STUDENT IN AN ORGANIZED HEALTH CARE EDUCATION/TRAINING PROGRAM

## 2024-07-18 PROCEDURE — 36415 COLL VENOUS BLD VENIPUNCTURE: CPT | Performed by: SURGERY

## 2024-07-18 PROCEDURE — 83735 ASSAY OF MAGNESIUM: CPT | Performed by: SURGERY

## 2024-07-18 PROCEDURE — 84075 ASSAY ALKALINE PHOSPHATASE: CPT | Performed by: SURGERY

## 2024-07-18 PROCEDURE — 1200000003 HC ONCOLOGY  ROOM WITH TELEMETRY DAILY

## 2024-07-18 PROCEDURE — 82947 ASSAY GLUCOSE BLOOD QUANT: CPT

## 2024-07-18 PROCEDURE — 85027 COMPLETE CBC AUTOMATED: CPT | Performed by: SURGERY

## 2024-07-18 RX ORDER — NAPROXEN SODIUM 220 MG/1
81 TABLET, FILM COATED ORAL DAILY
Status: DISCONTINUED | OUTPATIENT
Start: 2024-07-19 | End: 2024-07-19 | Stop reason: HOSPADM

## 2024-07-18 ASSESSMENT — PAIN - FUNCTIONAL ASSESSMENT
PAIN_FUNCTIONAL_ASSESSMENT: 0-10

## 2024-07-18 ASSESSMENT — COGNITIVE AND FUNCTIONAL STATUS - GENERAL
CLIMB 3 TO 5 STEPS WITH RAILING: A LOT
TURNING FROM BACK TO SIDE WHILE IN FLAT BAD: A LITTLE
MOVING TO AND FROM BED TO CHAIR: A LITTLE
WALKING IN HOSPITAL ROOM: A LITTLE
DAILY ACTIVITIY SCORE: 19
WALKING IN HOSPITAL ROOM: A LITTLE
MOVING FROM LYING ON BACK TO SITTING ON SIDE OF FLAT BED WITH BEDRAILS: A LITTLE
HELP NEEDED FOR BATHING: A LITTLE
MOVING FROM LYING ON BACK TO SITTING ON SIDE OF FLAT BED WITH BEDRAILS: A LITTLE
DRESSING REGULAR LOWER BODY CLOTHING: A LITTLE
DRESSING REGULAR UPPER BODY CLOTHING: A LITTLE
STANDING UP FROM CHAIR USING ARMS: A LITTLE
MOVING TO AND FROM BED TO CHAIR: A LITTLE
TURNING FROM BACK TO SIDE WHILE IN FLAT BAD: A LITTLE
TOILETING: A LITTLE
MOBILITY SCORE: 17
STANDING UP FROM CHAIR USING ARMS: A LITTLE
CLIMB 3 TO 5 STEPS WITH RAILING: A LOT
MOBILITY SCORE: 17
PERSONAL GROOMING: A LITTLE

## 2024-07-18 ASSESSMENT — PAIN SCALES - GENERAL
PAINLEVEL_OUTOF10: 9
PAINLEVEL_OUTOF10: 7
PAINLEVEL_OUTOF10: 4
PAINLEVEL_OUTOF10: 3
PAINLEVEL_OUTOF10: 5 - MODERATE PAIN
PAINLEVEL_OUTOF10: 7
PAINLEVEL_OUTOF10: 4

## 2024-07-18 NOTE — CARE PLAN
Problem: Skin  Goal: Promote/optimize nutrition  Outcome: Progressing     Problem: Pain  Goal: Takes deep breaths with improved pain control throughout the shift  Outcome: Progressing  Goal: Turns in bed with improved pain control throughout the shift  Outcome: Progressing  Goal: Walks with improved pain control throughout the shift  Outcome: Progressing  Goal: Performs ADL's with improved pain control throughout shift  Outcome: Progressing  Goal: Participates in PT with improved pain control throughout the shift  Outcome: Progressing  Goal: Free from opioid side effects throughout the shift  Outcome: Progressing   The patient's goals for the shift include      The clinical goals for the shift include pain management

## 2024-07-18 NOTE — PROGRESS NOTES
"Saul Ramirez is a 58 y.o. male on day 3 of admission presenting with Small bowel obstruction (Multi).    Subjective   NAEO.  Postop day 1 from diagnostic lap, small bowel resection with primary anastomosis.  Endorses pain well-controlled on oral pain meds.  Denies any nausea or vomiting fevers or chills.  Has not been out of bed yet.  Awaiting pathology results.  Awaiting return of bowel function    Objective     Constitutional: NAD, A&Ox3   Head/Neck: NCAT  Eyes: Anicteric   Cardiovascular: Regular rate and rhythm per peripheral palpation   Respiratory: Breathing comfortably on RA with symmetric chest rise   Abdominal: Soft, non-distended, mildly tender to deep palpation supraumbilically; NG in place with gastric output   : Deferred   Ext: MAEx4  Psych: Appropriate mood and affect       Last Recorded Vitals  Blood pressure 132/74, pulse 54, temperature 36.6 °C (97.9 °F), temperature source Temporal, resp. rate 16, height 1.803 m (5' 11\"), weight 86 kg (189 lb 9.5 oz), SpO2 99%.  Intake/Output last 3 Shifts:  I/O last 3 completed shifts:  In: 3241.9 (37.7 mL/kg) [I.V.:2441.9 (28.4 mL/kg); IV Piggyback:800]  Out: 2170 (25.2 mL/kg) [Urine:1375 (0.4 mL/kg/hr); Emesis/NG output:775; Blood:20]  Weight: 86 kg     Relevant Results  No results found for this or any previous visit (from the past 24 hour(s)).  CT chest wo IV contrast  Narrative: Interpreted By:  René Yen and Ohs Zachary   STUDY:  CT CHEST WO IV CONTRAST;  7/15/2024 1:36 pm      INDICATION:  Signs/Symptoms:staging.      COMPARISON:  CT abdomen/pelvis 07/15/2024 CT cardiac scoring 02/27/2024.      ACCESSION NUMBER(S):  [id number]      ORDERING CLINICIAN:  MAXIMO MALONEY      TECHNIQUE:  Helical data acquisition of the chest was obtained without  intravenous contrast. Images were reformatted in axial, coronal, and  sagittal planes.      FINDINGS:  Per EMR, 58-year-old male, never smoker, with history of CAD who  presented to the emergency " department with 36 hours of abdominal pain  nausea and vomiting with CT abdomen/pelvis demonstrating small-bowel  mass in the jejunum with multiple dilated loops of small bowel filter  represent adenocarcinoma versus just versus neuroendocrine tumor. CT  chest without contrast ordered for staging.      LUNGS AND AIRWAYS:  The trachea and central airways are patent. No endobronchial lesion  is seen.      Right basilar atelectasis/scarring, similar to prior exam. No  pneumothorax, pleural effusion or focal consolidation.      MEDIASTINUM AND ALLAN, LOWER NECK AND AXILLA:  The visualized thyroid gland is within normal limits.  No evidence of thoracic lymphadenopathy by CT criteria.  Enteric tube distal tip terminates within the stomach.      HEART AND VESSELS:  The thoracic aorta normal in course and caliber.No significant  calcified atherosclerotic disease of the thoracic aorta. Main  pulmonary artery and its branches are normal in caliber. Moderate  coronary artery calcifications are seen. Please note,the study is not  optimized for evaluation of coronary arteries. The cardiac chambers  are not enlarged. There is no pericardial effusion seen.      UPPER ABDOMEN:  Hyperdense layering material within the gallbladder and bilateral  renal pelvises consistent with vicarious excretion of contrast. Round  renal hypodensities, 1 of which is too small to characterize, the  largest of which represents a simple renal cyst (series 2, image 329).      CHEST WALL AND OSSEOUS STRUCTURES:  Chest wall is within normal limits.  No acute osseous pathology.There are no suspicious osseous  lesions.Mild multilevel degenerative changes within visualized spine.      Impression: 1.  No evidence of thoracic malignancy/metastasis.  2. No evidence acute cardiopulmonary process.      I personally reviewed the images/study and I agree with the findings  as stated by Dr. Taz Koehler. This study was interpreted at  [facility]  [place], [place].      Signed by: René Yen 7/15/2024 3:19 PM  Dictation workstation:   [id number]  ECG 12 lead  Normal sinus rhythm  Cannot rule out Anterior infarct , age undetermined  Abnormal ECG  No previous ECGs available  See ED provider note for full interpretation and clinical correlation  Confirmed by Ricardo Medeiros ([id number]) on 7/15/2024 11:26:53 AM  XR chest 1 view  Narrative: Interpreted By:  Lamin Benton and Bartolomei Aguilar Christopher   STUDY:  XR CHEST 1 VIEW;  7/15/2024 3:54 am      INDICATION:  Signs/Symptoms:eval for NG tube placement.      COMPARISON:  None.      ACCESSION NUMBER(S):  [id number]      ORDERING CLINICIAN:  MOHAN DYER      FINDINGS:  AP radiograph of the chest was provided.      Enteric tube coursing below the level of the diaphragm with distal  tip overlying the left hemiabdomen in the expected location of the  gastric fundus/body.      CARDIOMEDIASTINAL SILHOUETTE:  Cardiomediastinal silhouette is normal in size and configuration.      LUNGS:  Patchy bibasilar atelectasis      ABDOMEN:  No remarkable upper abdominal findings.      BONES:  No acute osseous changes.      Impression: 1.  No evidence of acute cardiopulmonary process.  2. Enteric tube as above.      I personally reviewed the images/study and I agree with the findings  as stated by Justyn Welsh MD (Radiology Resident).  This study was interpreted at [place], [place].      MACRO:  None      Signed by: Lamin Benton 7/15/2024 4:35 AM  Dictation workstation:   [id number]  CT abdomen pelvis w IV contrast  Narrative: Interpreted By:  Josh Reyes and Bartolomei Aguilar Christopher   STUDY:  CT ABDOMEN PELVIS W IV CONTRAST;  7/15/2024 1:00 am      INDICATION:  Signs/Symptoms: Abdominal pain, epigastric.      COMPARISON:  None.      ACCESSION NUMBER(S):  [id number]      ORDERING CLINICIAN:  NGUYEN CRAVEN       TECHNIQUE:  Contiguous axial images of the abdomen and pelvis were obtained after  the intravenous administration of iodinated contrast. Coronal and  sagittal reformatted images were reconstructed from the axial data.      FINDINGS:  LOWER CHEST: Bibasilar scarring/atelectasis, otherwise no acute  abnormality. Severe coronary artery atherosclerosis.          ABDOMEN/PELVIS:      ABDOMINAL WALL: Tiny fat containing umbilical hernia.      LIVER: No significant parenchymal abnormality.      BILE DUCTS: No significant intrahepatic or extrahepatic dilatation.      GALLBLADDER: No significant abnormality.      PANCREAS: No significant abnormality.      SPLEEN: No significant abnormality.      ADRENALS: No significant abnormality.      KIDNEYS, URETERS, BLADDER: Bilateral simple renal cysts. No  hydroureteronephrosis. Bladder is unremarkable.      REPRODUCTIVE ORGANS: No significant abnormality.      VESSELS: Mild atherosclerosis.      RETROPERITONEUM/LYMPH NODES: No enlarged lymph nodes. No acute  retroperitoneal abnormality.      BOWEL/MESENTERY/PERITONEUM: Stomach is distended with focal area of  narrowing at the junction of the gastric body and antrum (203/26).  There is dilation of jejunal loops measuring up to 3.4 cm (203/40)  with a transition point in the midline (201/116) where there is an  enhancing lesion measuring 1.5 x 1.7 cm.      Small volume free fluid in the pelvis. Mild interloop edema is  present.          MUSCULOSKELETAL: No acute osseous abnormality. No suspicious osseous  lesion.      Impression: 1. Findings suggestive of small-bowel obstruction with dilation of  jejunal loops measuring up to 3.4 cm with a transition point in the  midline pelvis where there is an apparent 1.5 x 1.7 cm enhancing  lesion. If patient has history of prior abdominal surgery, this may  be an adhesion with the enhancement reflecting collapsed bowel loop.  If patient does not have prior abdominal surgery, this may be a  small  bowel lesion, most commonly a neuroendocrine tumor. Recommend  surgical consultation.  2. Small volume free fluid in the pelvis and interloop edema, likely  reactive to the above process.  3. Focal narrowing at the junction of the gastric body and antrum is  favored to represent peristalsis/contraction but underlying lesion  can not be excluded. This can be further evaluated with endoscopy or  monitored on follow-up CTs for small-bowel obstruction.      I personally reviewed the images/study and I agree with the findings  as stated by Justyn Welsh MD (Radiology Resident).  This study was interpreted at [facility], [place], [place].      MACRO:  Josh Reyes discussed the significance and urgency of this critical  finding by telephone with ED physician covering for NGUYEN HERBER  on 7/15/2024 at 2:03 am.  (**-RCF-**) Findings:  See findings.      Signed by: Josh Reyes 7/15/2024 2:11 AM  Dictation workstation:   [id number]        Assessment/Plan   Saul Ramirez is a 58 y.o. 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         PLAN:  Neuro: Tylenol, prn dilaudid     CV: Holding home ASA; holding the following home meds: atorvastatin 40mg daily, hydrochlorothiazide 12.5mg daily, losartan 100mg daily.    Resp: Encourage OOB/IS; CT chest for staging workup unremarkable     GI: NG to gravity trial, PPI, NPO except sips for meds to progress pending gravity trial;     : MIVF to come off pending gravity trial, monitor and replete electrolytes as clinically indicated     Heme: monitor cbc as clinically indicated;  and CEA tomorrow      PPX: Lovenox + SCDs      Joseluis Christie MD  PGY-1 General Surgery  Surgical Oncology [id number]

## 2024-07-18 NOTE — PROGRESS NOTES
Physical Therapy    Physical Therapy Evaluation    Patient Name: Saul Ramirez  MRN: [id number]  Room: [facility]/6013-A  Today's Date: 7/18/2024   Time Calculation  Start Time: 0950  Stop Time: 1009  Time Calculation (min): 19 min    Assessment/Plan   PT Assessment  PT Assessment Results: Decreased mobility, Impaired balance, Pain  Rehab Prognosis: Good  Barriers to Discharge: medical acuity  End of Session Communication: Bedside nurse  End of Session Patient Position: Up in chair  IP OR SWING BED PT PLAN  Inpatient or Swing Bed: Inpatient  PT Plan  Treatment/Interventions: Bed mobility, Transfer training, Gait training, Stair training, Neuromuscular re-education, Balance training, Therapeutic exercise, Therapeutic activity  PT Plan: Ongoing PT  PT Frequency: 3 times per week  PT Discharge Recommendations: No PT needed after discharge  PT Recommended Transfer Status: Stand by assist  PT - OK to Discharge: Yes    Subjective     General Visit Information:  Subjective: Pt is alert and agreeable to treatment. He is looking forward to walking today.   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, CAD, HTN, HLD  Prior to Session Communication: Bedside nurse  Patient Position Received: Bed, 3 rail up  Family/Caregiver Present: Yes  Caregiver Feedback: wife and son   Home Living:  Home Living  Type of Home: House  Lives With: Spouse, Adult children  Home Adaptive Equipment: None  Home Layout: Multi-level (12 steps to get upstairs with L rail)  Home Access: Stairs to enter without rails  Entrance Stairs-Rails: None  Entrance Stairs-Number of Steps: 2  Prior Level of Function:  Prior Function Per Pt/Caregiver Report  Level of [place]: Independent with ADLs and functional transfers  Precautions:  Precautions  Medical Precautions: Fall precautions,  Abdominal precautions  Vital Signs:     Medical Gas Therapy: None (Room air)    Lines/Tubes/Drains:  Urethral Catheter Non-latex 16 Fr. (Active)   Number of days: 1       NG/OG/Feeding Tube Right nostril (Active)   Number of days: 3     Medical Gas Therapy: None (Room air)  Continuous Medications/Drips:  lactated Ringer's, 75 mL/hr, Last Rate: 75 mL/hr (07/18/24 0210)        Objective   Pain:  Pain Assessment  Pain Assessment: 0-10  0-10 (Numeric) Pain Score: 4 (4)  Pain Type: Surgical pain  Pain Location: Abdomen    Cognition:  Cognition  Overall Cognitive Status: Within Functional Limits  Orientation Level: Oriented X4    General Assessments:  Extremity/Trunk Assessments:  Tone: No abnormalities noted  Sensation  Light Touch: No apparent deficits  Coordination  Movements are Fluid and Coordinated: Yes  Upper Extremity  ROM: WNL  Strength:WFL  Lower Extremity  ROM: WNL  Strength: WFL   Sitting Static Balance Normal  Sitting Dynamic Balance Normal  Standing Static Balance Normal  Standing Dynamic Balance Not NormalUE Support Two UE support and Assist Level Supervision    Functional Assessments:  Bed Mobility  Bed Mobility: Yes  Bed Mobility 1  Bed Mobility 1: Supine to sitting  Level of Assistance 1: Close supervision  Bed Mobility Comments 1: modified roll    Transfers  Transfer: Yes  Transfer 1  Transfer From 1: Sit to  Transfer to 1: Stand  Transfer Device 1: Walker  Transfer Level of Assistance 1: Close supervision  Transfers 2  Transfer From 2: Stand to  Transfer to 2: Sit  Transfer Device 2: Walker  Transfer Level of Assistance 2: Close supervision  Transfers 3  Transfer From 3: Bed to  Transfer to 3: Chair with arms  Transfer Device 3: Walker  Transfer Level of Assistance 3: Close supervision    Ambulation/Gait Training  Ambulation/Gait Training Performed: Yes  Ambulation/Gait Training 1  Surface 1: Level tile  Device 1: Rolling walker  Assistance 1: Close supervision  Quality of Gait 1: Wide base of support,  Forward flexed posture, Antalgic  Comments/Distance (ft) 1: 600'              Outcome Measures:  [facility] Basic Mobility  Turning from your back to your side while in a flat bed without using bedrails: A little  Moving from lying on your back to sitting on the side of a flat bed without using bedrails: A little  Moving to and from bed to chair (including a wheelchair): A little  Standing up from a chair using your arms (e.g. wheelchair or bedside chair): A little  To walk in hospital room: A little  Climbing 3-5 steps with railing: A lot  Basic Mobility - Total Score: 17                 Tinetti  Sitting Balance: Steady, safe  Arises: Able, uses arms to help  Attempts to Arise: Able to arise, one attempt  Immediate Standing Balance (First 5 Seconds): Steady without walker or other support  Standing Balance: Narrow stance without support  Nudged: Steady without walker or other support  Eyes Closed: Steady  Turned 360 Degrees: Steadiness: Steady  Turned 360 Degrees: Continuity of Steps: Continuous  Sitting Down: Uses arms or not a smooth motion  Balance Score: 14  Initiation of Gait: No hesitancy  Step Height: R Swing Foot: Right foot complete clears floor  Step Length: R Swing Foot: Passes left stance foot  Step Height: L Swing Foot: Left foot complete clears floor  Step Length: L Swing Foot: Passes right stance foot  Step Symmetry: Right and left step appear equal  Step Continuity: Steps appear continuous  Path: Mild/moderate deviation or uses walking aid  Trunk: Marked sway or uses walking aid  Walking Time: Heels almost touching while walking  Gait Score: 9  Total Score: 23          Encounter Problems       Encounter Problems (Active)       PT Problem       Patient will complete supine to sit and sit to supine Independent        Start:  07/18/24    Expected End:  08/01/24            Patient will ambulate >300' with No Device and Independent        Start:  07/18/24    Expected End:  08/01/24            Patient will  ascend/descend 12 steps with Left Rail Ascending and independence        Start:  07/18/24    Expected End:  08/01/24            Patient will perform sit<>stand transfer with No Device, and Independent        Start:  07/18/24    Expected End:  08/01/24                 Assessment: Pt is admitted 2/2 Exploration Laparoscopy, Laparotomy, small bowel resection, Exploration Laparotomy, Resection Small Intestine. He is supervision with functional mobility 2/2 impaired balance. He is a moderate falls risk based on Tinetti score. Educated on abdominal precautions and plan for PT. Patient would benefit from further therapy to increase functional independence and safety.  Will continue to follow during acute stay.       Education Documentation  Precautions, taught by ALIREZA Palencia at 7/18/2024 10:53 AM.  Learner: Family, Patient  Readiness: Acceptance  Method: Explanation  Response: Needs Reinforcement    Body Mechanics, taught by ALIREZA Palencia at 7/18/2024 10:53 AM.  Learner: Family, Patient  Readiness: Acceptance  Method: Explanation  Response: Needs Reinforcement    Mobility Training, taught by ALIREZA Palencia at 7/18/2024 10:53 AM.  Learner: Family, Patient  Readiness: Acceptance  Method: Explanation  Response: Needs Reinforcement    Education Comments  No comments found.          07/18/24 at 11:04 AM   ALIREZA PALENCIA   Rehab Office: [phone]

## 2024-07-18 NOTE — CARE PLAN
Problem: Skin  Goal: Decreased wound size/increased tissue granulation at next dressing change  Outcome: Progressing  Goal: Participates in plan/prevention/treatment measures  Outcome: Progressing  Goal: Prevent/manage excess moisture  Outcome: Progressing  Goal: Prevent/minimize sheer/friction injuries  Outcome: Progressing  Goal: Promote/optimize nutrition  Outcome: Progressing  Goal: Promote skin healing  Outcome: Progressing     Problem: Pain  Goal: Takes deep breaths with improved pain control throughout the shift  Outcome: Progressing  Goal: Turns in bed with improved pain control throughout the shift  Outcome: Progressing  Goal: Walks with improved pain control throughout the shift  Outcome: Progressing  Goal: Performs ADL's with improved pain control throughout shift  Outcome: Progressing  Goal: Participates in PT with improved pain control throughout the shift  Outcome: Progressing  Goal: Free from opioid side effects throughout the shift  Outcome: Progressing  Goal: Free from acute confusion related to pain meds throughout the shift  Outcome: Progressing      I will START or STAY ON the medications listed below when I get home from the hospital:    acetaminophen 160 mg/5 mL oral suspension  -- 15 milliliter(s) by mouth every 6 hours, As needed, Mild Pain (1 - 3)  -- Indication: For mild pain    oxyCODONE 5 mg/5 mL oral solution  -- 3 milliliter(s) by mouth every 6 hours, As Needed -Moderate Pain (4 - 6) MDD:12mL  -- Indication: For severe pain    diazePAM 5 mg/5 mL oral solution  -- 3 milliliter(s) by mouth every 8 hours, As needed, spasm MDD:9mL  -- Indication: For muscle spasm    docusate sodium 10 mg/mL oral liquid  -- 10 milliliter(s) by mouth once a day, As Needed -for constipation   -- Indication: For constipation    polyethylene glycol 3350 oral powder for reconstitution  -- 17 gram(s) by mouth once a day, As Needed   -- Indication: For constipation

## 2024-07-19 ENCOUNTER — PHARMACY VISIT (OUTPATIENT)
Dept: PHARMACY | Facility: CLINIC | Age: 59
End: 2024-07-19
Payer: MEDICARE

## 2024-07-19 VITALS
DIASTOLIC BLOOD PRESSURE: 73 MMHG | OXYGEN SATURATION: 99 % | HEART RATE: 65 BPM | RESPIRATION RATE: 18 BRPM | HEIGHT: 71 IN | WEIGHT: 189.6 LBS | BODY MASS INDEX: 26.54 KG/M2 | TEMPERATURE: 97.7 F | SYSTOLIC BLOOD PRESSURE: 123 MMHG

## 2024-07-19 PROCEDURE — RXMED WILLOW AMBULATORY MEDICATION CHARGE

## 2024-07-19 PROCEDURE — 2500000001 HC RX 250 WO HCPCS SELF ADMINISTERED DRUGS (ALT 637 FOR MEDICARE OP)

## 2024-07-19 PROCEDURE — 2500000004 HC RX 250 GENERAL PHARMACY W/ HCPCS (ALT 636 FOR OP/ED)

## 2024-07-19 PROCEDURE — 2500000001 HC RX 250 WO HCPCS SELF ADMINISTERED DRUGS (ALT 637 FOR MEDICARE OP): Performed by: NURSE PRACTITIONER

## 2024-07-19 PROCEDURE — 2500000004 HC RX 250 GENERAL PHARMACY W/ HCPCS (ALT 636 FOR OP/ED): Performed by: NURSE PRACTITIONER

## 2024-07-19 PROCEDURE — 99238 HOSP IP/OBS DSCHRG MGMT 30/<: CPT | Performed by: NURSE PRACTITIONER

## 2024-07-19 PROCEDURE — 2500000004 HC RX 250 GENERAL PHARMACY W/ HCPCS (ALT 636 FOR OP/ED): Performed by: SURGERY

## 2024-07-19 RX ORDER — PANTOPRAZOLE SODIUM 40 MG/1
40 TABLET, DELAYED RELEASE ORAL
Qty: 30 TABLET | Refills: 11 | Status: SHIPPED | OUTPATIENT
Start: 2024-07-20

## 2024-07-19 RX ORDER — ACETAMINOPHEN 325 MG/1
975 TABLET ORAL EVERY 8 HOURS
Status: DISCONTINUED | OUTPATIENT
Start: 2024-07-19 | End: 2024-07-19 | Stop reason: HOSPADM

## 2024-07-19 RX ORDER — TRAMADOL HYDROCHLORIDE 50 MG/1
50 TABLET ORAL EVERY 6 HOURS PRN
Qty: 28 TABLET | Refills: 0 | Status: SHIPPED | OUTPATIENT
Start: 2024-07-19 | End: 2024-07-26

## 2024-07-19 RX ORDER — PANTOPRAZOLE SODIUM 40 MG/1
40 TABLET, DELAYED RELEASE ORAL
Status: DISCONTINUED | OUTPATIENT
Start: 2024-07-19 | End: 2024-07-19 | Stop reason: HOSPADM

## 2024-07-19 RX ORDER — OXYCODONE HYDROCHLORIDE 5 MG/1
5 TABLET ORAL EVERY 4 HOURS PRN
Status: DISCONTINUED | OUTPATIENT
Start: 2024-07-19 | End: 2024-07-19 | Stop reason: HOSPADM

## 2024-07-19 RX ORDER — TRAMADOL HYDROCHLORIDE 50 MG/1
50 TABLET ORAL EVERY 6 HOURS PRN
Status: DISCONTINUED | OUTPATIENT
Start: 2024-07-19 | End: 2024-07-19 | Stop reason: HOSPADM

## 2024-07-19 RX ORDER — POLYETHYLENE GLYCOL 3350 17 G/17G
17 POWDER, FOR SOLUTION ORAL DAILY
COMMUNITY
Start: 2024-07-20

## 2024-07-19 RX ORDER — ENOXAPARIN SODIUM 100 MG/ML
40 INJECTION SUBCUTANEOUS DAILY
Qty: 10.4 ML | Refills: 0 | Status: SHIPPED | OUTPATIENT
Start: 2024-07-20

## 2024-07-19 RX ORDER — ACETAMINOPHEN 325 MG/1
975 TABLET ORAL EVERY 8 HOURS
COMMUNITY
Start: 2024-07-19

## 2024-07-19 RX ORDER — ONDANSETRON 4 MG/1
4 TABLET, FILM COATED ORAL EVERY 6 HOURS PRN
Status: DISCONTINUED | OUTPATIENT
Start: 2024-07-19 | End: 2024-07-19 | Stop reason: HOSPADM

## 2024-07-19 RX ORDER — POLYETHYLENE GLYCOL 3350 17 G/17G
17 POWDER, FOR SOLUTION ORAL DAILY
Status: DISCONTINUED | OUTPATIENT
Start: 2024-07-19 | End: 2024-07-19 | Stop reason: HOSPADM

## 2024-07-19 RX ORDER — HYDROCHLOROTHIAZIDE 25 MG/1
12.5 TABLET ORAL DAILY
Status: DISCONTINUED | OUTPATIENT
Start: 2024-07-19 | End: 2024-07-19 | Stop reason: HOSPADM

## 2024-07-19 RX ORDER — ATORVASTATIN CALCIUM 40 MG/1
40 TABLET, FILM COATED ORAL NIGHTLY
Status: DISCONTINUED | OUTPATIENT
Start: 2024-07-19 | End: 2024-07-19 | Stop reason: HOSPADM

## 2024-07-19 ASSESSMENT — PAIN DESCRIPTION - ORIENTATION
ORIENTATION: MID

## 2024-07-19 ASSESSMENT — COGNITIVE AND FUNCTIONAL STATUS - GENERAL
DAILY ACTIVITIY SCORE: 24
MOBILITY SCORE: 24

## 2024-07-19 ASSESSMENT — PAIN SCALES - PAIN ASSESSMENT IN ADVANCED DEMENTIA (PAINAD): TOTALSCORE: MEDICATION (SEE MAR)

## 2024-07-19 ASSESSMENT — PAIN SCALES - GENERAL
PAINLEVEL_OUTOF10: 2
PAINLEVEL_OUTOF10: 6
PAINLEVEL_OUTOF10: 4
PAINLEVEL_OUTOF10: 4

## 2024-07-19 ASSESSMENT — PAIN - FUNCTIONAL ASSESSMENT: PAIN_FUNCTIONAL_ASSESSMENT: 0-10

## 2024-07-19 ASSESSMENT — PAIN DESCRIPTION - LOCATION
LOCATION: ABDOMEN

## 2024-07-19 NOTE — PROGRESS NOTES
07/19/24 1100   Discharge Planning   Living Arrangements Spouse/significant other   Support Systems Spouse/significant other;Children   Type of Residence Private residence   Home or Post Acute Services None   Expected Discharge Disposition Home     Pt was discussed in IDT rounds.  No discharge needs identified.  Care Transitions is available as discharge needs arise.  BRE Finch

## 2024-07-19 NOTE — CARE PLAN
Problem: Skin  Goal: Decreased wound size/increased tissue granulation at next dressing change  Outcome: Progressing  Goal: Participates in plan/prevention/treatment measures  Outcome: Progressing  Goal: Prevent/manage excess moisture  Outcome: Progressing  Goal: Prevent/minimize sheer/friction injuries  Outcome: Progressing  Goal: Promote/optimize nutrition  Outcome: Progressing  Goal: Promote skin healing  Outcome: Progressing     Problem: Pain  Goal: Takes deep breaths with improved pain control throughout the shift  Outcome: Progressing  Goal: Turns in bed with improved pain control throughout the shift  Outcome: Progressing  Goal: Walks with improved pain control throughout the shift  Outcome: Progressing  Goal: Performs ADL's with improved pain control throughout shift  Outcome: Progressing  Goal: Participates in PT with improved pain control throughout the shift  Outcome: Progressing  Goal: Free from opioid side effects throughout the shift  Outcome: Progressing  Goal: Free from acute confusion related to pain meds throughout the shift  Outcome: Progressing     Problem: Fall/Injury  Goal: Not fall by end of shift  Outcome: Progressing  Goal: Be free from injury by end of the shift  Outcome: Progressing  Goal: Verbalize understanding of personal risk factors for fall in the hospital  Outcome: Progressing  Goal: Verbalize understanding of risk factor reduction measures to prevent injury from fall in the home  Outcome: Progressing  Goal: Use assistive devices by end of the shift  Outcome: Progressing  Goal: Pace activities to prevent fatigue by end of the shift  Outcome: Progressing      The clinical goals for the shift include pt to remain free of falls and dangle/ toe touch at bedside 4x for 60 min by end of shift

## 2024-07-19 NOTE — DISCHARGE SUMMARY
"Discharge Diagnosis  Small bowel obstruction (Multi)    Test Results Pending At Discharge  Pending Labs       Order Current Status    Chromogranin A In process    Surgical Pathology Exam In process            Hospital Course   58 yr old male with PMHx of CAD, HTN, HLD who presented to the ED with abdominal pain and N/V.   CT A/P demonstrated dilated loops of small bowel proximal to a 1.5 x 1.7 cm mass in the jejunum.  NGT placed for decompression.  Taken to the OR 7/17 and underwent ex lap, laparotomy, SBR with Dr Galicia.  Post-op course uncomplicated.  NGT removed POD 1 and diet advanced as tolerated.  Mott removed POD 1 and passed TOV.  IV medication transitioned to oral.  DC home POD # on extended DVT prophylaxis.  Will follow up with Dr Fong.     Pertinent Physical Exam At Time of Discharge  Neurological: Awake, alert, conversive  Respiratory/Thorax: even, unlabored  Genitourinary: voiding  Gastrointestinal: soft, NT, ND.  Incision well approximated.  Skin: warm, dry  Musculoskeletal: JERONIMO  Eyes: non-icteric  Extremities: no edema   Psychological: appropriate mood/affect     /73 (BP Location: Right arm, Patient Position: Sitting)   Pulse 65   Temp 36.5 °C (97.7 °F) (Temporal)   Resp 18   Ht 1.803 m (5' 11\")   Wt 86 kg (189 lb 9.5 oz)   SpO2 99%   BMI 26.44 kg/m²      Home Medications     Medication List      START taking these medications     acetaminophen 325 mg tablet; Commonly known as: Tylenol; Take 3 tablets   (975 mg) by mouth every 8 hours.   enoxaparin 40 mg/0.4 mL syringe; Commonly known as: Lovenox; Inject 0.4   mL (40 mg) under the skin once daily.   pantoprazole 40 mg EC tablet; Commonly known as: ProtoNix; Take 1 tablet   (40 mg) by mouth once daily in the morning. Take before meals. Do not   crush, chew, or split.   polyethylene glycol 17 gram packet; Commonly known as: Glycolax,   Miralax; Take 17 g by mouth once daily.   traMADol 50 mg tablet; Commonly known as: Ultram; Take 1 " tablet (50 mg)   by mouth every 6 hours if needed for severe pain (7 - 10) for up to 7   days.     CHANGE how you take these medications     hydroCHLOROthiazide 12.5 mg tablet; Commonly known as: Microzide; TAKE 1   TABLET BY MOUTH EVERY DAY; What changed: how much to take     CONTINUE taking these medications     aspirin 81 mg EC tablet   atorvastatin 40 mg tablet; Commonly known as: Lipitor; TAKE 1 TABLET BY   MOUTH EVERY DAY   cholecalciferol 25 MCG (1000 UT) tablet; Commonly known as: Vitamin D-3   losartan 100 mg tablet; Commonly known as: Cozaar; TAKE 1 TABLET BY   MOUTH EVERY DAY   multivitamin tablet   sildenafil 100 mg tablet; Commonly known as: Viagra; TAKE 1 TABLET DAILY   AS NEEDED APPROXIMATELY 1 HOUR BEFORE SEXUAL ACTIVITY       Outpatient Follow-Up  Future Appointments   Date Time Provider Department Center   7/30/2024  1:30 PM Hernan Fong MD MPH [id number] [facility]   11/22/2024  7:00 AM Robbie So MD [id number] [facility]       Serina Leung, APRN-CNP

## 2024-07-25 LAB
CHROMOGRANIN A, LC/MS/MS: 201 NG/ML
LAB AP ASR DISCLAIMER: NORMAL
LAB AP BLOCK FOR ADDITIONAL STUDIES: NORMAL
LABORATORY COMMENT REPORT: NORMAL
Lab: NORMAL
PATH REPORT.COMMENTS IMP SPEC: NORMAL
PATH REPORT.FINAL DX SPEC: NORMAL
PATH REPORT.GROSS SPEC: NORMAL
PATH REPORT.RELEVANT HX SPEC: NORMAL
PATH REPORT.TOTAL CANCER: NORMAL
PATHOLOGY SYNOPTIC REPORT: NORMAL

## 2024-07-29 NOTE — DOCUMENTATION CLARIFICATION NOTE
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    Question: Based on your assessment of the patient and the pathology findings, can the 24 pathology findings be confirmed by providing the requested documentation to include if specimen is benign or malignant, primary or secondary and any metastatic sites.  Please include diagnosis of disease    When answering this query, please exercise your independent professional judgment. The fact that a question is being asked, does not imply that any particular answer is desired or expected.    The patient's clinical indicators include:  Clinical Information:  Per the 24 Discharge Summary:  ? 58 yr old male with PMHx of CAD, HTN, HLD who presented to the ED with abdominal pain and N/V.   CT A/P demonstrated dilated loops of small bowel proximal to a 1.5 x 1.7 cm mass in the jejunum. ?    Surgical Pathology from 24 resulted as -  ? Tumor  Tumor Site:    Small intestine, not otherwise specified  Histologic Type and Grade:    G2, well-differentiated neuroendocrine tumor  Tumor Focality:    Unifocal  Tumor Extent:    Invades visceral peritoneum, serosa  Lymphovascular Invasion:    Present  Perineural Invasion:    Not identified ?    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

## 2024-07-30 ENCOUNTER — OFFICE VISIT (OUTPATIENT)
Dept: SURGICAL ONCOLOGY | Facility: CLINIC | Age: 59
End: 2024-07-30
Payer: COMMERCIAL

## 2024-07-30 VITALS
BODY MASS INDEX: 25.38 KG/M2 | SYSTOLIC BLOOD PRESSURE: 107 MMHG | TEMPERATURE: 97.5 F | RESPIRATION RATE: 18 BRPM | HEART RATE: 67 BPM | OXYGEN SATURATION: 100 % | WEIGHT: 182 LBS | DIASTOLIC BLOOD PRESSURE: 65 MMHG

## 2024-07-30 DIAGNOSIS — C7A.8 NEUROENDOCRINE CARCINOMA (MULTI): ICD-10-CM

## 2024-07-30 DIAGNOSIS — C7A.8 PRIMARY MALIGNANT NEUROENDOCRINE TUMOR OF SMALL INTESTINE (MULTI): Primary | ICD-10-CM

## 2024-07-30 LAB
LAB AP ASR DISCLAIMER: NORMAL
LAB AP BLOCK FOR ADDITIONAL STUDIES: NORMAL
LABORATORY COMMENT REPORT: NORMAL
Lab: NORMAL
PATH REPORT.ADDENDUM SPEC: NORMAL
PATH REPORT.COMMENTS IMP SPEC: NORMAL
PATH REPORT.FINAL DX SPEC: NORMAL
PATH REPORT.GROSS SPEC: NORMAL
PATH REPORT.RELEVANT HX SPEC: NORMAL
PATH REPORT.TOTAL CANCER: NORMAL
PATHOLOGY SYNOPTIC REPORT: NORMAL

## 2024-07-30 PROCEDURE — 99211 OFF/OP EST MAY X REQ PHY/QHP: CPT | Performed by: SURGERY

## 2024-07-30 PROCEDURE — 3074F SYST BP LT 130 MM HG: CPT | Performed by: SURGERY

## 2024-07-30 PROCEDURE — 3078F DIAST BP <80 MM HG: CPT | Performed by: SURGERY

## 2024-07-30 ASSESSMENT — PAIN SCALES - GENERAL: PAINLEVEL: 0-NO PAIN

## 2024-07-30 NOTE — PROGRESS NOTES
Postoperative Visit    Referring Provider:  No ref. provider found   Robbie So MD    Chief Complaint:  Chief Complaint   Patient presents with    Follow-up   Small Bowel Neuroendocrine Tumor, well differentiated Grade 2    History of Present Illness:  This is a 58 y.o. 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  He was admitted for NG decompression and eventual surgery on 7/17/2024 with Dr. Myke Galicia.  During this procedure, the abdomen was inspected with laparoscopy and a liver lesion was biopsied and noted to be benign.  The patient then had a mini laparotomy incision through which the small bowel was elevated out of the body and inspected by palpation for additional lesions.  No additional lesions were identified and a segmental resection of the small bowel and associated mesentery was performed.     Surgery 7/17/2024 - Diagnostic laparoscopy, Small bowel resection  Pathology 0- well-differentiated grade 2 small bowel neuroendocrine tumor. No lymph nodes identified in the mesentery    7/29/2024 - POV.  Recovering very well.  Eating without discomfort.  Having regular bowel movements.  The abdominal wall is sore but manageable.  No evidence of acute issues.    Past Medical History:  Past Medical History:  No date: Personal history of other malignant neoplasm of skin      Comment:  History of squamous cell carcinoma of skin     Past Surgical History:  Past Surgical History:  01/15/2016: COLONOSCOPY      Comment:  Complete Colonoscopy  01/2016: COLONOSCOPY      Comment:  rpt 1-26 05/23/2013: OTHER SURGICAL HISTORY      Comment:  Primary Repair Of Ruptured Achilles Tendon     Medications:  Current Outpatient Medications   Medication Instructions    acetaminophen (TYLENOL) 975 mg, oral, Every 8 hours    aspirin 81 mg, oral, Daily    atorvastatin (Lipitor) 40 mg tablet TAKE 1 TABLET BY MOUTH EVERY DAY    cholecalciferol (VITAMIN D-3)  1,000 Units, oral, Daily    enoxaparin (LOVENOX) 40 mg, subcutaneous, Daily    hydroCHLOROthiazide (MICROZIDE) 12.5 mg, oral, Daily    losartan (COZAAR) 100 mg, oral, Daily    multivitamin tablet 1 tablet, oral, Daily    pantoprazole (PROTONIX) 40 mg, oral, Daily before breakfast, Do not crush, chew, or split.    polyethylene glycol (GLYCOLAX, MIRALAX) 17 g, oral, Daily    sildenafil (Viagra) 100 mg tablet TAKE 1 TABLET DAILY AS NEEDED APPROXIMATELY 1 HOUR BEFORE SEXUAL ACTIVITY        Allergies:  Allergies   Allergen Reactions    Ace Inhibitors Cough    Amoxicillin Rash        Family History:  family history includes Coronary artery disease in his father; Pancreatic cancer in his mother.     Social History:   reports that he has never smoked. He has never used smokeless tobacco. He reports current alcohol use of about 8.0 standard drinks of alcohol per week. He reports that he does not use drugs.     Review of Systems:  A complete 12 point review of systems was performed and is negative except as noted in the history of present illness.    Vital Signs:  Vitals:    07/30/24 1345   BP: 107/65   Pulse: 67   Resp: 18   Temp: 36.4 °C (97.5 °F)   SpO2: 100%        Physical Exam:  GEN: No acute distress, Healthy appearing  HEENT: Moist mucus membranes, normocephalic  CARDS: RRR  PULM: No respiratory distress  GI: Soft, non-distended.  Minimally tender around incisions.  No undrained fluid collections  SKIN: Abdominal incisions are clean dry intact and covered with glue  NEURO: No gross sensorimotor deficits  EXT: No arm or leg swelling    Laboratory Values:  Lab Results   Component Value Date    WBC 6.4 07/18/2024    HGB 12.7 (L) 07/18/2024    HCT 38.7 (L) 07/18/2024    MCV 92 07/18/2024     (L) 07/18/2024        Chemistry    Lab Results   Component Value Date/Time     07/18/2024 0719    K 3.9 07/18/2024 0719     07/18/2024 0719    CO2 26 07/18/2024 0719    BUN 16 07/18/2024 0719    CREATININE 1.01  "07/18/2024 0719    Lab Results   Component Value Date/Time    CALCIUM 8.6 07/18/2024 0719    ALKPHOS 56 07/18/2024 0719    AST 21 07/18/2024 0719    ALT 10 07/18/2024 0719    BILITOT 1.1 07/18/2024 0719           No results found for: \"PR1\"      Imaging:  I have personally reviewed the images and the radiologist's report.  No images are attached to the encounter or orders placed in the encounter.     Assessment:  This is a 58 y.o. 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  Unfortunately no lymph nodes were identified within the specimen.  The patient has recovered well from surgery.    Plan:  -- Netspot scan to evaluate for distant or regional disease.  -- Referral to medical oncology, Dr. Adam Robles  -- Follow up with pathology to determine if any nodes can be seen in the surgical specimen  -- The patient asked very appropriate questions that were answered to the best of my ability with the current information at hand. He knows to call with any questions or concerns that arise    Hernan Fong MD, MPH    "

## 2024-08-01 ENCOUNTER — TELEPHONE (OUTPATIENT)
Dept: HEMATOLOGY/ONCOLOGY | Facility: HOSPITAL | Age: 59
End: 2024-08-01
Payer: COMMERCIAL

## 2024-08-01 NOTE — TELEPHONE ENCOUNTER
Called pt to let him know that we got his new patient visit with Dr. Robles to be sooner on 8/21 at 3pm. Patient verbalized understanding and agreement regarding discussed information/plan.

## 2024-08-04 DIAGNOSIS — I10 ESSENTIAL (PRIMARY) HYPERTENSION: ICD-10-CM

## 2024-08-05 RX ORDER — LOSARTAN POTASSIUM 100 MG/1
100 TABLET ORAL DAILY
Qty: 90 TABLET | Refills: 1 | Status: SHIPPED | OUTPATIENT
Start: 2024-08-05

## 2024-08-13 ENCOUNTER — HOSPITAL ENCOUNTER (OUTPATIENT)
Dept: RADIOLOGY | Facility: HOSPITAL | Age: 59
Discharge: HOME | End: 2024-08-13
Payer: COMMERCIAL

## 2024-08-13 DIAGNOSIS — C7A.8 NEUROENDOCRINE CARCINOMA (MULTI): ICD-10-CM

## 2024-08-13 PROCEDURE — 78815 PET IMAGE W/CT SKULL-THIGH: CPT | Mod: PI

## 2024-08-13 PROCEDURE — A9587 GALLIUM GA-68: HCPCS | Performed by: SURGERY

## 2024-08-13 PROCEDURE — 78815 PET IMAGE W/CT SKULL-THIGH: CPT | Mod: PET TUMOR INIT TX STRAT | Performed by: STUDENT IN AN ORGANIZED HEALTH CARE EDUCATION/TRAINING PROGRAM

## 2024-08-13 PROCEDURE — 3430000001 HC RX 343 DIAGNOSTIC RADIOPHARMACEUTICALS: Performed by: SURGERY

## 2024-08-21 ENCOUNTER — LAB (OUTPATIENT)
Dept: LAB | Facility: HOSPITAL | Age: 59
End: 2024-08-21
Payer: COMMERCIAL

## 2024-08-21 ENCOUNTER — OFFICE VISIT (OUTPATIENT)
Dept: HEMATOLOGY/ONCOLOGY | Facility: HOSPITAL | Age: 59
End: 2024-08-21
Payer: COMMERCIAL

## 2024-08-21 VITALS
BODY MASS INDEX: 26.39 KG/M2 | DIASTOLIC BLOOD PRESSURE: 83 MMHG | WEIGHT: 184.3 LBS | HEART RATE: 51 BPM | RESPIRATION RATE: 18 BRPM | SYSTOLIC BLOOD PRESSURE: 131 MMHG | TEMPERATURE: 97.3 F | OXYGEN SATURATION: 100 % | HEIGHT: 70 IN

## 2024-08-21 DIAGNOSIS — C7A.8 NEUROENDOCRINE CARCINOMA (MULTI): ICD-10-CM

## 2024-08-21 DIAGNOSIS — Z00.00 HEALTH CARE MAINTENANCE: ICD-10-CM

## 2024-08-21 LAB — PSA SERPL-MCNC: 2.19 NG/ML

## 2024-08-21 PROCEDURE — 36415 COLL VENOUS BLD VENIPUNCTURE: CPT

## 2024-08-21 PROCEDURE — 3008F BODY MASS INDEX DOCD: CPT | Performed by: INTERNAL MEDICINE

## 2024-08-21 PROCEDURE — 83497 ASSAY OF 5-HIAA: CPT

## 2024-08-21 PROCEDURE — 1036F TOBACCO NON-USER: CPT | Performed by: INTERNAL MEDICINE

## 2024-08-21 PROCEDURE — 3075F SYST BP GE 130 - 139MM HG: CPT | Performed by: INTERNAL MEDICINE

## 2024-08-21 PROCEDURE — 84153 ASSAY OF PSA TOTAL: CPT

## 2024-08-21 PROCEDURE — 3079F DIAST BP 80-89 MM HG: CPT | Performed by: INTERNAL MEDICINE

## 2024-08-21 PROCEDURE — 99215 OFFICE O/P EST HI 40 MIN: CPT | Performed by: INTERNAL MEDICINE

## 2024-08-21 ASSESSMENT — PAIN SCALES - GENERAL: PAINLEVEL: 0-NO PAIN

## 2024-08-21 ASSESSMENT — PATIENT HEALTH QUESTIONNAIRE - PHQ9
SUM OF ALL RESPONSES TO PHQ9 QUESTIONS 1 AND 2: 0
1. LITTLE INTEREST OR PLEASURE IN DOING THINGS: NOT AT ALL
2. FEELING DOWN, DEPRESSED OR HOPELESS: NOT AT ALL

## 2024-08-21 NOTE — PROGRESS NOTES
.Patient ID: Saul Ramirez is a 58 y.o. male.  Referring Physician: Hernan Fong MD MPH  [address]  Department [place] [address]  Primary Care Provider: Robbie So MD      Chief complaint : Localized SB NET    HPI  58 yr old male with PMHx of CAD, HTN, HLD who presented to the ED with abdominal pain and N/V.   CT A/P demonstrated dilated loops of small bowel proximal to a 1.5 x 1.7 cm mass in the jejunum.  NGT placed for decompression.  Taken to the OR 7/17 and underwent ex lap, laparotomy, SBR with Dr Galicia.  Post-op course uncomplicated. Path showed T4N0M0 G2 well diff. NET (Ki67 3.8%). Pt used to have symptoms that was described as IBS for many years with intermittent diarrhea.   Today: He denies abdominal pain, no nausea or vomiting and no more diarrhea.         SYSTEMIC TREATMENT RECEIVED       Subjective   Please refer to Notes above for complete History of present illness.          Review of Systems:   All 14 systems have been reviewed and were negative except for the HPI.       MEDICAL HISTORY  Past Medical History:   Diagnosis Date    Personal history of other malignant neoplasm of skin     History of squamous cell carcinoma of skin       FAMILY HISTORY  Family History   Problem Relation Name Age of Onset    Pancreatic cancer Mother      Coronary artery disease Father         TOBACCO HISTORY  Tobacco Use: Low Risk  (8/21/2024)    Patient History     Smoking Tobacco Use: Never     Smokeless Tobacco Use: Never     Passive Exposure: Not on file       SOCIAL HISTORY  Social Connections: Not on file        Outpatient Medication Profile:  Current Outpatient Medications on File Prior to Visit   Medication Sig Dispense Refill    aspirin 81 mg EC tablet Take 1 tablet (81 mg) by mouth once daily.      atorvastatin (Lipitor) 40 mg tablet TAKE 1 TABLET BY MOUTH EVERY DAY 90 tablet 1    cholecalciferol (Vitamin D-3) 25 MCG (1000 UT) tablet Take 1 tablet (1,000 Units)  "by mouth once daily.      hydroCHLOROthiazide (HYDRODiuril) 12.5 mg tablet TAKE 1 TABLET BY MOUTH EVERY DAY 90 tablet 1    losartan (Cozaar) 100 mg tablet TAKE 1 TABLET BY MOUTH EVERY DAY 90 tablet 1    multivitamin tablet Take 1 tablet by mouth once daily.      sildenafil (Viagra) 100 mg tablet TAKE 1 TABLET DAILY AS NEEDED APPROXIMATELY 1 HOUR BEFORE SEXUAL ACTIVITY 6 tablet 11    acetaminophen (Tylenol) 325 mg tablet Take 3 tablets (975 mg) by mouth every 8 hours.      enoxaparin (Lovenox) 40 mg/0.4 mL syringe Inject 0.4 mL (40 mg) under the skin once daily. 10.4 mL 0    pantoprazole (ProtoNix) 40 mg EC tablet Take 1 tablet (40 mg) by mouth once daily in the morning. Take before meals. Do not crush, chew, or split. 30 tablet 11    polyethylene glycol (Glycolax, Miralax) 17 gram packet Take 17 g by mouth once daily.       No current facility-administered medications on file prior to visit.         Performance Status:  Asymptomatic     Vitals and Measurements:   /83 (BP Location: Left arm, Patient Position: Sitting, BP Cuff Size: Adult)   Pulse 51   Temp 36.3 °C (97.3 °F) (Temporal)   Resp 18   Ht 1.785 m (5' 10.28\")   Wt 83.6 kg (184 lb 4.9 oz)   SpO2 100%   BMI 26.24 kg/m²       Physical Exam:   General: Appears well and in NAD  Eyes: PERRL, EOMI, clear sclera  ENMT: mucous membranes moist, no apparent injury, no lesions seen  Head/Neck: Neck supple, no apparent injury, thyroid without mass or tenderness, No JVD, trachea midline,   Thorax: Patent airways, CTAB, normal breath sounds with good chest expansion, thorax symmetric  Cardiovascular: Regular, rate and rhythm, no murmurs, 2+ equal pulses of the extremities, normal S 1and S 2  Gastrointestinal: Nondistended, soft, non-tender, no rebound tenderness or guarding, no masses palpable, no organomegaly, +BS  Musculoskeletal: ROM intact, no joint swelling, normal strength  Extremities: normal extremities, no cyanosis edema, contusions or wounds, no " clubbing  Neurological: alert and oriented x3, intact senses, motor, response and reflexes, normal strength  Lymphatic: No significant lymphadenopathy  Psychological: Appropriate mood and behavior  Skin: Warm and dry, no lesions, no rashes         Lab Results:  I have reviewed these laboratory results:     Lab Results   Component Value Date    WBC 6.4 07/18/2024    HGB 12.7 (L) 07/18/2024    HCT 38.7 (L) 07/18/2024    MCV 92 07/18/2024     (L) 07/18/2024         Chemistry    Lab Results   Component Value Date/Time     07/18/2024 0719    K 3.9 07/18/2024 0719     07/18/2024 0719    CO2 26 07/18/2024 0719    BUN 16 07/18/2024 0719    CREATININE 1.01 07/18/2024 0719    Lab Results   Component Value Date/Time    CALCIUM 8.6 07/18/2024 0719    ALKPHOS 56 07/18/2024 0719    AST 21 07/18/2024 0719    ALT 10 07/18/2024 0719    BILITOT 1.1 07/18/2024 0719            Lab Results   Component Value Date    TSH 3.76 11/17/2023        Radiology Result:  I have reviewed the latest Imaging in PACS and the findings are noted in this note. I discussed the results of the latest imaging with the patient. All previous imaging were reviewed at the time it was completed. Full records are available in the EMR for review as well.     === 07/14/24 ===    CT CHEST WO IV CONTRAST    - Impression -  1.  No evidence of thoracic malignancy/metastasis.  2. No evidence acute cardiopulmonary process.    I personally reviewed the images/study and I agree with the findings  as stated by Dr. Taz Koehler. This study was interpreted at  [place], [place].    Signed by: René Yen 7/15/2024 3:19 PM  Dictation workstation:   [id number]               Pathology Results:  I have reviewed the full pathology report recorded in the EMR. The pertinent portions indicating diagnosis are listed here in the note. for details please refer to the full report recorded in the EMR.    Assessment and Plan:      Localized SB NET (G2, Ki67 3.8%)    58 yr old male with PMHx of CAD, HTN, HLD who presented to the ED with abdominal pain and N/V.   CT A/P demonstrated dilated loops of small bowel proximal to a 1.5 x 1.7 cm mass in the jejunum.  NGT placed for decompression.  Taken to the OR 7/17 and underwent ex lap, laparotomy, SBR with Dr Galicia.  Post-op course uncomplicated. Path showed T4N0M0 G2 well diff. NET (Ki67 3.8%). PET-Ga68 showed only update in uncinate process which is mostly false positive. Pt used to have symptoms that was described as IBS for many years with intermittent diarrhea.   Today: He denies abdominal pain, no nausea or vomiting and no more diarrhea.     Plan:  - MRI pancreas  - 5HIAA  - If negative will resume with CT AP in 6 months then annually for surveillance.        DISCLAIMER:   In preparing for this visit and writing this note, I reviewed all the previous electronic medical records (labs, imaging and medical charts) of the patient available in the physician portal. Significant findings which helped in decision making are recorded  in this chart.     The plan was discussed with the patient. We gave him ample opportunities to ask questions. All questions were answered to his satisfaction and he verbalized understanding.       Adam Robles M.D.   and Director of the Neuroendocrine Tumor Program  Gastrointestinal Medical Oncology  Department of Hematology and Oncology  [facility], [place] [address]  Phone (Office): [phone]  Fax:  [phone]   Email: [email or web address]  Learn more about [facility] Comprehensive Neuroendocrine Tumor Program: Click Here  Learn more about [place] Neuroendocrine [employer] Society: [email or web address]

## 2024-08-21 NOTE — PROGRESS NOTES
Pt and his wife are here for a NPV with Dr. Robles. Pt scans reviewed with MD. Orders for MRI, 5HIAA labs placed. FUV 9/11. Disease binder was given to pt. Instructed pt to call with any questions or concerns. Patient verbalized understanding and agreement regarding discussed information/plan. Pt escorted to scheduling.

## 2024-08-22 ENCOUNTER — TELEPHONE (OUTPATIENT)
Dept: ADMISSION | Facility: HOSPITAL | Age: 59
End: 2024-08-22
Payer: COMMERCIAL

## 2024-08-22 DIAGNOSIS — F41.9 ANXIETY: Primary | ICD-10-CM

## 2024-08-22 RX ORDER — LORAZEPAM 1 MG/1
1 TABLET ORAL ONCE
Qty: 1 TABLET | Refills: 0 | Status: SHIPPED | OUTPATIENT
Start: 2024-08-22 | End: 2024-08-22

## 2024-08-22 NOTE — TELEPHONE ENCOUNTER
Willian notified Ativan rx has been sent in for him to take. He confirmed his wife will be driving him to/from his MRI appt. No further questions or concerns at this time.

## 2024-08-22 NOTE — TELEPHONE ENCOUNTER
Willian has an MRI on Monday; asking if something could be sent in to his [facility] on file for claustrophobia.

## 2024-08-26 ENCOUNTER — HOSPITAL ENCOUNTER (OUTPATIENT)
Dept: RADIOLOGY | Facility: HOSPITAL | Age: 59
Discharge: HOME | End: 2024-08-26
Payer: COMMERCIAL

## 2024-08-26 DIAGNOSIS — C7A.8 NEUROENDOCRINE CARCINOMA (MULTI): ICD-10-CM

## 2024-08-26 PROCEDURE — A9575 INJ GADOTERATE MEGLUMI 0.1ML: HCPCS | Performed by: INTERNAL MEDICINE

## 2024-08-26 PROCEDURE — 2550000001 HC RX 255 CONTRASTS: Performed by: INTERNAL MEDICINE

## 2024-08-26 PROCEDURE — 74183 MRI ABD W/O CNTR FLWD CNTR: CPT

## 2024-08-26 RX ORDER — GADOTERATE MEGLUMINE 376.9 MG/ML
17 INJECTION INTRAVENOUS
Status: COMPLETED | OUTPATIENT
Start: 2024-08-26 | End: 2024-08-26

## 2024-08-28 ENCOUNTER — APPOINTMENT (OUTPATIENT)
Dept: HEMATOLOGY/ONCOLOGY | Facility: HOSPITAL | Age: 59
End: 2024-08-28
Payer: COMMERCIAL

## 2024-08-30 LAB — 5OH-INDOLEACETATE SERPL-MCNC: 7 NG/ML

## 2024-09-03 ENCOUNTER — APPOINTMENT (OUTPATIENT)
Dept: HEMATOLOGY/ONCOLOGY | Facility: HOSPITAL | Age: 59
End: 2024-09-03
Payer: COMMERCIAL

## 2024-09-03 NOTE — TUMOR BOARD NOTE
Tumor Board Documentation    The case was presented by Adam Robles at our Tumor Board on 09/03/2024, which included representatives from.    58 yr old male with Localized SB NET (G2, Ki67 3.8%) , T4N0M0.  7/17 and underwent ex lap, laparotomy, SBR   PET-Ga68 showed only update in uncinate process   So want to review the PET scan on 08/13/2024 and the MRI on 08/26/2024  The pathology was reviewed and confirmed the present report.  Scans showed no evidence of disease and the uncinate process is mostly false positive as the MRI was negative   Additionally, we reviewed previous medical and familial history, history of present illness, and recent lab results along with all available histopathologic and imaging studies. The tumor board considered available treatment options and made the following recommendations:    Plan:  - Resume annual surveillance with anatomical scans   - Refer to genetics due to family history of pancreatic cancer     National site-specific guidelines were discussed with respect to the case.

## 2024-09-11 ENCOUNTER — OFFICE VISIT (OUTPATIENT)
Dept: HEMATOLOGY/ONCOLOGY | Facility: HOSPITAL | Age: 59
End: 2024-09-11
Payer: COMMERCIAL

## 2024-09-11 VITALS
OXYGEN SATURATION: 100 % | SYSTOLIC BLOOD PRESSURE: 127 MMHG | WEIGHT: 184.08 LBS | BODY MASS INDEX: 26.21 KG/M2 | RESPIRATION RATE: 18 BRPM | HEART RATE: 61 BPM | DIASTOLIC BLOOD PRESSURE: 74 MMHG | TEMPERATURE: 97.2 F

## 2024-09-11 DIAGNOSIS — C7A.8 NEUROENDOCRINE CARCINOMA (MULTI): ICD-10-CM

## 2024-09-11 PROCEDURE — 99215 OFFICE O/P EST HI 40 MIN: CPT | Performed by: INTERNAL MEDICINE

## 2024-09-11 PROCEDURE — 3074F SYST BP LT 130 MM HG: CPT | Performed by: INTERNAL MEDICINE

## 2024-09-11 PROCEDURE — 1036F TOBACCO NON-USER: CPT | Performed by: INTERNAL MEDICINE

## 2024-09-11 PROCEDURE — 3078F DIAST BP <80 MM HG: CPT | Performed by: INTERNAL MEDICINE

## 2024-09-11 ASSESSMENT — PAIN SCALES - GENERAL: PAINLEVEL: 0-NO PAIN

## 2024-09-11 NOTE — PROGRESS NOTES
.Patient ID: Saul Ramirez is a 58 y.o. male.  Referring Physician: Adam Robles MD  [id number] [id number]  Primary Care Provider: Robbie So MD      Chief complaint : Localized SB NET    HPI  58 yr old male with PMHx of CAD, HTN, HLD who presented to the ED with abdominal pain and N/V.   CT A/P demonstrated dilated loops of small bowel proximal to a 1.5 x 1.7 cm mass in the jejunum.  NGT placed for decompression.  Taken to the OR 7/17 and underwent ex lap, laparotomy, SBR with Dr Galicia.  Post-op course uncomplicated. Path showed T4N0M0 G2 well diff. NET (Ki67 3.8%). Pt used to have symptoms that was described as IBS for many years with intermittent diarrhea.   Today: He denies abdominal pain, no nausea or vomiting and no more diarrhea.         SYSTEMIC TREATMENT RECEIVED       Subjective   Please refer to Notes above for complete History of present illness.          Review of Systems:   All 14 systems have been reviewed and were negative except for the HPI.       MEDICAL HISTORY  Past Medical History:   Diagnosis Date    Personal history of other malignant neoplasm of skin     History of squamous cell carcinoma of skin       FAMILY HISTORY  Family History   Problem Relation Name Age of Onset    Pancreatic cancer Mother      Coronary artery disease Father         TOBACCO HISTORY  Tobacco Use: Low Risk  (9/11/2024)    Patient History     Smoking Tobacco Use: Never     Smokeless Tobacco Use: Never     Passive Exposure: Not on file       SOCIAL HISTORY  Social Connections: Not on file        Outpatient Medication Profile:  Current Outpatient Medications on File Prior to Visit   Medication Sig Dispense Refill    aspirin 81 mg EC tablet Take 1 tablet (81 mg) by mouth once daily.      atorvastatin (Lipitor) 40 mg tablet TAKE 1 TABLET BY MOUTH EVERY DAY 90 tablet 1    cholecalciferol (Vitamin D-3) 25 MCG (1000 UT) tablet Take 1 tablet (1,000 Units) by mouth once daily.      hydroCHLOROthiazide  (HYDRODiuril) 12.5 mg tablet TAKE 1 TABLET BY MOUTH EVERY DAY 90 tablet 1    losartan (Cozaar) 100 mg tablet TAKE 1 TABLET BY MOUTH EVERY DAY 90 tablet 1    multivitamin tablet Take 1 tablet by mouth once daily.      sildenafil (Viagra) 100 mg tablet TAKE 1 TABLET DAILY AS NEEDED APPROXIMATELY 1 HOUR BEFORE SEXUAL ACTIVITY 6 tablet 11    acetaminophen (Tylenol) 325 mg tablet Take 3 tablets (975 mg) by mouth every 8 hours.      enoxaparin (Lovenox) 40 mg/0.4 mL syringe Inject 0.4 mL (40 mg) under the skin once daily. 10.4 mL 0    LORazepam (Ativan) 1 mg tablet Take 1 tablet (1 mg) by mouth 1 time for 1 dose. 1 hour before MRI on Monday 08/26/2024 1 tablet 0    pantoprazole (ProtoNix) 40 mg EC tablet Take 1 tablet (40 mg) by mouth once daily in the morning. Take before meals. Do not crush, chew, or split. 30 tablet 11    polyethylene glycol (Glycolax, Miralax) 17 gram packet Take 17 g by mouth once daily.       No current facility-administered medications on file prior to visit.         Performance Status:  Asymptomatic     Vitals and Measurements:   /74 (BP Location: Left arm, Patient Position: Sitting, BP Cuff Size: Adult)   Pulse 61   Temp 36.2 °C (97.2 °F) (Temporal)   Resp 18   Wt 83.5 kg (184 lb 1.4 oz)   SpO2 100%   BMI 26.21 kg/m²       Physical Exam:   General: Appears well and in NAD  Eyes: PERRL, EOMI, clear sclera  ENMT: mucous membranes moist, no apparent injury, no lesions seen  Head/Neck: Neck supple, no apparent injury, thyroid without mass or tenderness, No JVD, trachea midline,   Thorax: Patent airways, CTAB, normal breath sounds with good chest expansion, thorax symmetric  Cardiovascular: Regular, rate and rhythm, no murmurs, 2+ equal pulses of the extremities, normal S 1and S 2  Gastrointestinal: Nondistended, soft, non-tender, no rebound tenderness or guarding, no masses palpable, no organomegaly, +BS  Musculoskeletal: ROM intact, no joint swelling, normal strength  Extremities: normal  extremities, no cyanosis edema, contusions or wounds, no clubbing  Neurological: alert and oriented x3, intact senses, motor, response and reflexes, normal strength  Lymphatic: No significant lymphadenopathy  Psychological: Appropriate mood and behavior  Skin: Warm and dry, no lesions, no rashes         Lab Results:  I have reviewed these laboratory results:     Lab Results   Component Value Date    WBC 6.4 07/18/2024    HGB 12.7 (L) 07/18/2024    HCT 38.7 (L) 07/18/2024    MCV 92 07/18/2024     (L) 07/18/2024         Chemistry    Lab Results   Component Value Date/Time     07/18/2024 0719    K 3.9 07/18/2024 0719     07/18/2024 0719    CO2 26 07/18/2024 0719    BUN 16 07/18/2024 0719    CREATININE 1.01 07/18/2024 0719    Lab Results   Component Value Date/Time    CALCIUM 8.6 07/18/2024 0719    ALKPHOS 56 07/18/2024 0719    AST 21 07/18/2024 0719    ALT 10 07/18/2024 0719    BILITOT 1.1 07/18/2024 0719            Lab Results   Component Value Date    TSH 3.76 11/17/2023        Radiology Result:  I have reviewed the latest Imaging in PACS and the findings are noted in this note. I discussed the results of the latest imaging with the patient. All previous imaging were reviewed at the time it was completed. Full records are available in the EMR for review as well.     === 07/14/24 ===    CT CHEST WO IV CONTRAST    - Impression -  1.  No evidence of thoracic malignancy/metastasis.  2. No evidence acute cardiopulmonary process.    I personally reviewed the images/study and I agree with the findings  as stated by Dr. Taz Koehler. This study was interpreted at  [facility], [place], [place].    Signed by: René Yen 7/15/2024 3:19 PM  Dictation workstation:   [id number]               Pathology Results:  I have reviewed the full pathology report recorded in the EMR. The pertinent portions indicating diagnosis are listed here in the note. for details please refer to the  full report recorded in the EMR.    Assessment and Plan:     Localized SB NET (G2, Ki67 3.8%)    58 yr old male with PMHx of CAD, HTN, HLD who presented to the ED with abdominal pain and N/V.   CT A/P demonstrated dilated loops of small bowel proximal to a 1.5 x 1.7 cm mass in the jejunum.  NGT placed for decompression.  Taken to the OR 7/17 and underwent ex lap, laparotomy, SBR with Dr Galicia.  Post-op course uncomplicated. Path showed T4N0M0 G2 well diff. NET (Ki67 3.8%). PET-Ga68 showed only update in uncinate process which is mostly false positive. Pt used to have symptoms that was described as IBS for many years with intermittent diarrhea.   5HIAA was normal 7.0 ng/ml  MRI abdomen: No suspicious pancreatic lesions and there is only liver hemangioma and reactive portocaval LN  Today: He denies abdominal pain, no nausea or vomiting and no more diarrhea.     Plan:  - Will resume with CT AP in 6 months then annually for surveillance.        DISCLAIMER:   In preparing for this visit and writing this note, I reviewed all the previous electronic medical records (labs, imaging and medical charts) of the patient available in the physician portal. Significant findings which helped in decision making are recorded  in this chart.     The plan was discussed with the patient. We gave him ample opportunities to ask questions. All questions were answered to his satisfaction and he verbalized understanding.       Adam Robles M.D.   and Director of the Neuroendocrine Tumor Program  Gastrointestinal Medical Oncology  Department of Hematology and Oncology  [facility], [place] [address]  Phone (Office): [phone]  Fax:  [phone]   Email: [email or web address]  Learn more about [facility] Comprehensive Neuroendocrine Tumor Program: Click Here  Learn more about [place] Neuroendocrine Tumor  Society: [email or web address]

## 2024-11-15 ASSESSMENT — PROMIS GLOBAL HEALTH SCALE
RATE_SOCIAL_SATISFACTION: VERY GOOD
RATE_PHYSICAL_HEALTH: VERY GOOD
CARRYOUT_SOCIAL_ACTIVITIES: VERY GOOD
RATE_AVERAGE_PAIN: 0
CARRYOUT_PHYSICAL_ACTIVITIES: COMPLETELY
RATE_MENTAL_HEALTH: VERY GOOD
RATE_GENERAL_HEALTH: VERY GOOD
EMOTIONAL_PROBLEMS: RARELY
RATE_QUALITY_OF_LIFE: VERY GOOD

## 2024-11-22 ENCOUNTER — LAB (OUTPATIENT)
Dept: LAB | Facility: LAB | Age: 59
End: 2024-11-22
Payer: COMMERCIAL

## 2024-11-22 ENCOUNTER — APPOINTMENT (OUTPATIENT)
Dept: PRIMARY CARE | Facility: CLINIC | Age: 59
End: 2024-11-22
Payer: COMMERCIAL

## 2024-11-22 VITALS
HEART RATE: 60 BPM | DIASTOLIC BLOOD PRESSURE: 80 MMHG | SYSTOLIC BLOOD PRESSURE: 128 MMHG | HEIGHT: 70 IN | BODY MASS INDEX: 26.57 KG/M2 | TEMPERATURE: 97.9 F | WEIGHT: 185.6 LBS

## 2024-11-22 DIAGNOSIS — Z00.00 HEALTH CARE MAINTENANCE: ICD-10-CM

## 2024-11-22 DIAGNOSIS — I25.10 CORONARY ARTERY CALCIFICATION: ICD-10-CM

## 2024-11-22 DIAGNOSIS — C7A.8 NEUROENDOCRINE CARCINOMA OF COLON (MULTI): ICD-10-CM

## 2024-11-22 DIAGNOSIS — Z85.828 HISTORY OF SQUAMOUS CELL CARCINOMA OF SKIN: ICD-10-CM

## 2024-11-22 DIAGNOSIS — R79.89 ABNORMAL BILIRUBIN TEST: ICD-10-CM

## 2024-11-22 DIAGNOSIS — Z00.00 HEALTH CARE MAINTENANCE: Primary | ICD-10-CM

## 2024-11-22 DIAGNOSIS — E78.49 OTHER HYPERLIPIDEMIA: ICD-10-CM

## 2024-11-22 DIAGNOSIS — Z23 NEED FOR PNEUMOCOCCAL 20-VALENT CONJUGATE VACCINATION: ICD-10-CM

## 2024-11-22 DIAGNOSIS — I10 BENIGN ESSENTIAL HYPERTENSION: ICD-10-CM

## 2024-11-22 LAB
25(OH)D3 SERPL-MCNC: 49 NG/ML (ref 30–100)
ALBUMIN SERPL BCP-MCNC: 4.4 G/DL (ref 3.4–5)
ALP SERPL-CCNC: 71 U/L (ref 33–120)
ALT SERPL W P-5'-P-CCNC: 16 U/L (ref 10–52)
ANION GAP SERPL CALC-SCNC: 13 MMOL/L (ref 10–20)
APPEARANCE UR: CLEAR
AST SERPL W P-5'-P-CCNC: 17 U/L (ref 9–39)
BASOPHILS # BLD AUTO: 0.03 X10*3/UL (ref 0–0.1)
BASOPHILS NFR BLD AUTO: 0.5 %
BILIRUB SERPL-MCNC: 2.3 MG/DL (ref 0–1.2)
BILIRUB UR STRIP.AUTO-MCNC: NEGATIVE MG/DL
BUN SERPL-MCNC: 21 MG/DL (ref 6–23)
CALCIUM SERPL-MCNC: 10.1 MG/DL (ref 8.6–10.6)
CHLORIDE SERPL-SCNC: 101 MMOL/L (ref 98–107)
CHOLEST SERPL-MCNC: 139 MG/DL (ref 0–199)
CHOLESTEROL/HDL RATIO: 3.2
CO2 SERPL-SCNC: 30 MMOL/L (ref 21–32)
COLOR UR: NORMAL
CREAT SERPL-MCNC: 1.14 MG/DL (ref 0.5–1.3)
CREAT UR-MCNC: 130.3 MG/DL (ref 20–370)
EGFRCR SERPLBLD CKD-EPI 2021: 74 ML/MIN/1.73M*2
EOSINOPHIL # BLD AUTO: 0.4 X10*3/UL (ref 0–0.7)
EOSINOPHIL NFR BLD AUTO: 7 %
ERYTHROCYTE [DISTWIDTH] IN BLOOD BY AUTOMATED COUNT: 12.7 % (ref 11.5–14.5)
GLUCOSE SERPL-MCNC: 85 MG/DL (ref 74–99)
GLUCOSE UR STRIP.AUTO-MCNC: NORMAL MG/DL
HCT VFR BLD AUTO: 43.5 % (ref 41–52)
HDLC SERPL-MCNC: 43.8 MG/DL
HGB BLD-MCNC: 14.6 G/DL (ref 13.5–17.5)
HIV 1+2 AB+HIV1 P24 AG SERPL QL IA: NONREACTIVE
IMM GRANULOCYTES # BLD AUTO: 0.01 X10*3/UL (ref 0–0.7)
IMM GRANULOCYTES NFR BLD AUTO: 0.2 % (ref 0–0.9)
KETONES UR STRIP.AUTO-MCNC: NEGATIVE MG/DL
LDLC SERPL CALC-MCNC: 72 MG/DL
LEUKOCYTE ESTERASE UR QL STRIP.AUTO: NEGATIVE
LYMPHOCYTES # BLD AUTO: 1.33 X10*3/UL (ref 1.2–4.8)
LYMPHOCYTES NFR BLD AUTO: 23.2 %
MCH RBC QN AUTO: 29.4 PG (ref 26–34)
MCHC RBC AUTO-ENTMCNC: 33.6 G/DL (ref 32–36)
MCV RBC AUTO: 88 FL (ref 80–100)
MICROALBUMIN UR-MCNC: <7 MG/L
MICROALBUMIN/CREAT UR: NORMAL MG/G{CREAT}
MONOCYTES # BLD AUTO: 0.48 X10*3/UL (ref 0.1–1)
MONOCYTES NFR BLD AUTO: 8.4 %
NEUTROPHILS # BLD AUTO: 3.49 X10*3/UL (ref 1.2–7.7)
NEUTROPHILS NFR BLD AUTO: 60.7 %
NITRITE UR QL STRIP.AUTO: NEGATIVE
NON HDL CHOLESTEROL: 95 MG/DL (ref 0–149)
NRBC BLD-RTO: 0 /100 WBCS (ref 0–0)
PH UR STRIP.AUTO: 6.5 [PH]
PLATELET # BLD AUTO: 152 X10*3/UL (ref 150–450)
POTASSIUM SERPL-SCNC: 4.3 MMOL/L (ref 3.5–5.3)
PROT SERPL-MCNC: 6.5 G/DL (ref 6.4–8.2)
PROT UR STRIP.AUTO-MCNC: NEGATIVE MG/DL
PSA SERPL-MCNC: 2.2 NG/ML
RBC # BLD AUTO: 4.96 X10*6/UL (ref 4.5–5.9)
RBC # UR STRIP.AUTO: NEGATIVE /UL
SODIUM SERPL-SCNC: 140 MMOL/L (ref 136–145)
SP GR UR STRIP.AUTO: 1.01
TRIGL SERPL-MCNC: 118 MG/DL (ref 0–149)
TSH SERPL-ACNC: 3.23 MIU/L (ref 0.44–3.98)
URATE SERPL-MCNC: 6.8 MG/DL (ref 4–7.5)
UROBILINOGEN UR STRIP.AUTO-MCNC: NORMAL MG/DL
VLDL: 24 MG/DL (ref 0–40)
WBC # BLD AUTO: 5.7 X10*3/UL (ref 4.4–11.3)

## 2024-11-22 PROCEDURE — 3008F BODY MASS INDEX DOCD: CPT | Performed by: INTERNAL MEDICINE

## 2024-11-22 PROCEDURE — 90471 IMMUNIZATION ADMIN: CPT | Performed by: INTERNAL MEDICINE

## 2024-11-22 PROCEDURE — 87389 HIV-1 AG W/HIV-1&-2 AB AG IA: CPT

## 2024-11-22 PROCEDURE — 80053 COMPREHEN METABOLIC PANEL: CPT

## 2024-11-22 PROCEDURE — 81003 URINALYSIS AUTO W/O SCOPE: CPT

## 2024-11-22 PROCEDURE — 36415 COLL VENOUS BLD VENIPUNCTURE: CPT

## 2024-11-22 PROCEDURE — 85025 COMPLETE CBC W/AUTO DIFF WBC: CPT

## 2024-11-22 PROCEDURE — 3079F DIAST BP 80-89 MM HG: CPT | Performed by: INTERNAL MEDICINE

## 2024-11-22 PROCEDURE — 3074F SYST BP LT 130 MM HG: CPT | Performed by: INTERNAL MEDICINE

## 2024-11-22 PROCEDURE — 90677 PCV20 VACCINE IM: CPT | Performed by: INTERNAL MEDICINE

## 2024-11-22 PROCEDURE — 99396 PREV VISIT EST AGE 40-64: CPT | Performed by: INTERNAL MEDICINE

## 2024-11-22 PROCEDURE — 82248 BILIRUBIN DIRECT: CPT

## 2024-11-22 PROCEDURE — 1036F TOBACCO NON-USER: CPT | Performed by: INTERNAL MEDICINE

## 2024-11-22 PROCEDURE — 82043 UR ALBUMIN QUANTITATIVE: CPT

## 2024-11-22 PROCEDURE — 84443 ASSAY THYROID STIM HORMONE: CPT

## 2024-11-22 PROCEDURE — 82570 ASSAY OF URINE CREATININE: CPT

## 2024-11-22 PROCEDURE — 80061 LIPID PANEL: CPT

## 2024-11-22 PROCEDURE — 84153 ASSAY OF PSA TOTAL: CPT

## 2024-11-22 PROCEDURE — 82306 VITAMIN D 25 HYDROXY: CPT

## 2024-11-22 PROCEDURE — 84550 ASSAY OF BLOOD/URIC ACID: CPT

## 2024-11-22 ASSESSMENT — ENCOUNTER SYMPTOMS
PALPITATIONS: 0
VOMITING: 0
DYSURIA: 0
ABDOMINAL DISTENTION: 0
CONSTIPATION: 0
NUMBNESS: 0
DIZZINESS: 0
LIGHT-HEADEDNESS: 0
WHEEZING: 0
ARTHRALGIAS: 0
ADENOPATHY: 0
JOINT SWELLING: 0
ANAL BLEEDING: 0
CHEST TIGHTNESS: 0
FREQUENCY: 0
SPEECH DIFFICULTY: 0
SLEEP DISTURBANCE: 0
FLANK PAIN: 0
TREMORS: 0
POLYPHAGIA: 0
DIFFICULTY URINATING: 0
EYE REDNESS: 0
DIAPHORESIS: 0
CHILLS: 0
SINUS PAIN: 0
HEADACHES: 0
WOUND: 0
COUGH: 0
ABDOMINAL PAIN: 0
RECTAL PAIN: 0
FATIGUE: 0
STRIDOR: 0
CHOKING: 0
EYE DISCHARGE: 0
NECK PAIN: 0
SORE THROAT: 0
EYE PAIN: 0
SINUS PRESSURE: 0
BLOOD IN STOOL: 0
APPETITE CHANGE: 0
SHORTNESS OF BREATH: 0
FACIAL ASYMMETRY: 0
NECK STIFFNESS: 0
DIARRHEA: 0
NAUSEA: 0
COLOR CHANGE: 0
POLYDIPSIA: 0
TROUBLE SWALLOWING: 0
HEMATURIA: 0
BACK PAIN: 0
RHINORRHEA: 0
PHOTOPHOBIA: 0
ACTIVITY CHANGE: 0
BRUISES/BLEEDS EASILY: 0
SEIZURES: 0
VOICE CHANGE: 0
EYE ITCHING: 0
FACIAL SWELLING: 0
WEAKNESS: 0
MYALGIAS: 0

## 2024-11-22 NOTE — PROGRESS NOTES
Subjective   Patient ID: Saul Ramirez is a 59 y.o. male who presents for No chief complaint on file..    Patient presents for physical exam.  He has been compliant with his medications, diet and exercise.  He has fully recovered from his surgery.  He overall feels well.  He denies any headaches, no dizziness, no sinus problems, no chest pain or shortness of breath.  He denies abdominal pain no nausea vomiting or diarrhea.  Reports no new musculoskeletal complaints.       Review of Systems   Constitutional:  Negative for activity change, appetite change, chills, diaphoresis and fatigue.   HENT:  Negative for congestion, dental problem, drooling, ear discharge, ear pain, facial swelling, hearing loss, mouth sores, nosebleeds, postnasal drip, rhinorrhea, sinus pressure, sinus pain, sneezing, sore throat, tinnitus, trouble swallowing and voice change.    Eyes:  Negative for photophobia, pain, discharge, redness, itching and visual disturbance.   Respiratory:  Negative for cough, choking, chest tightness, shortness of breath, wheezing and stridor.    Cardiovascular:  Negative for chest pain, palpitations and leg swelling.   Gastrointestinal:  Negative for abdominal distention, abdominal pain, anal bleeding, blood in stool, constipation, diarrhea, nausea, rectal pain and vomiting.   Endocrine: Negative for cold intolerance, heat intolerance, polydipsia, polyphagia and polyuria.   Genitourinary:  Negative for decreased urine volume, difficulty urinating, dysuria, enuresis, flank pain, frequency, genital sores, hematuria and urgency.   Musculoskeletal:  Negative for arthralgias, back pain, gait problem, joint swelling, myalgias, neck pain and neck stiffness.   Skin:  Negative for color change, pallor, rash and wound.   Neurological:  Negative for dizziness, tremors, seizures, syncope, facial asymmetry, speech difficulty, weakness, light-headedness, numbness and headaches.   Hematological:  Negative for adenopathy. Does  "not bruise/bleed easily.   Psychiatric/Behavioral:  Negative for sleep disturbance.        Objective   /80   Pulse 60   Temp 36.6 °C (97.9 °F)   Ht 1.785 m (5' 10.28\")   Wt 84.2 kg (185 lb 9.6 oz)   BMI 26.42 kg/m²     Physical Exam  Constitutional:       General: He is not in acute distress.     Appearance: Normal appearance. He is normal weight. He is not ill-appearing, toxic-appearing or diaphoretic.   HENT:      Head: Normocephalic and atraumatic.      Right Ear: Tympanic membrane, ear canal and external ear normal. There is no impacted cerumen.      Left Ear: Tympanic membrane, ear canal and external ear normal. There is no impacted cerumen.      Nose: Nose normal. No congestion or rhinorrhea.      Mouth/Throat:      Mouth: Mucous membranes are moist.      Pharynx: Oropharynx is clear. No oropharyngeal exudate or posterior oropharyngeal erythema.   Eyes:      General: No scleral icterus.        Right eye: No discharge.         Left eye: No discharge.      Extraocular Movements: Extraocular movements intact.      Conjunctiva/sclera: Conjunctivae normal.      Pupils: Pupils are equal, round, and reactive to light.   Neck:      Vascular: No carotid bruit.   Cardiovascular:      Rate and Rhythm: Normal rate and regular rhythm.      Pulses: Normal pulses.      Heart sounds: Normal heart sounds. No murmur heard.     No friction rub. No gallop.   Pulmonary:      Effort: No respiratory distress.      Breath sounds: No wheezing, rhonchi or rales.   Chest:      Chest wall: No tenderness.   Abdominal:      General: Abdomen is flat. There is no distension.      Palpations: Abdomen is soft. There is no mass.      Tenderness: There is no abdominal tenderness. There is no right CVA tenderness, left CVA tenderness or guarding.      Hernia: No hernia is present.      Comments: Midline abd scar. No erythema   Genitourinary:     Penis: Normal.       Testes: Normal.   Musculoskeletal:         General: No swelling, " tenderness, deformity or signs of injury.      Cervical back: Normal range of motion and neck supple. No rigidity or tenderness.      Right lower leg: No edema.      Left lower leg: No edema.      Comments: Onychomychosis, bluish discoloration on toes   Lymphadenopathy:      Cervical: No cervical adenopathy.   Skin:     General: Skin is warm.      Coloration: Skin is not jaundiced or pale.      Findings: No bruising, erythema, lesion or rash.   Neurological:      General: No focal deficit present.      Mental Status: He is alert and oriented to person, place, and time.      Cranial Nerves: No cranial nerve deficit.      Sensory: No sensory deficit.      Motor: No weakness.      Coordination: Coordination normal.      Gait: Gait normal.      Deep Tendon Reflexes: Reflexes normal.       Assessment/Plan   Diagnoses and all orders for this visit:  Health care maintenance-colonoscopy is up-to-date.  Eye, dental and dermatology appointments have been done.  -     Albumin-Creatinine Ratio, Urine Random; Future  -     Vitamin D 25-Hydroxy,Total (for eval of Vitamin D levels); Future  -     CBC and Auto Differential; Future  -     Comprehensive Metabolic Panel; Future  -     Prostate Specific Antigen; Future  -     TSH with reflex to Free T4 if abnormal; Future  -     Uric Acid; Future  -     Urinalysis with Reflex Microscopic; Future  -     Lipid Panel; Future  -     HIV 1/2 Antigen/Antibody Screen with Reflex to Confirmation; Future  Need for pneumococcal 20-valent conjugate vaccination  -     Pneumococcal conjugate vaccine, 20-valent (PREVNAR 20)  Benign essential hypertension-low-salt diet and exercise  Coronary artery calcification-will modify risk factors  Other hyperlipidemia-check a fast lipid profile  History of squamous cell carcinoma of skin  Neuroendocrine carcinoma of colon (Multi)-follow-up with oncology.  Repeat CT scans next year.

## 2024-11-23 DIAGNOSIS — R79.89 ABNORMAL BILIRUBIN TEST: Primary | ICD-10-CM

## 2024-11-23 LAB — BILIRUB DIRECT SERPL-MCNC: 0.4 MG/DL (ref 0–0.3)

## 2024-12-22 DIAGNOSIS — I10 ESSENTIAL (PRIMARY) HYPERTENSION: ICD-10-CM

## 2024-12-23 RX ORDER — HYDROCHLOROTHIAZIDE 12.5 MG/1
12.5 TABLET ORAL DAILY
Qty: 90 TABLET | Refills: 3 | Status: SHIPPED | OUTPATIENT
Start: 2024-12-23

## 2025-01-25 DIAGNOSIS — I10 ESSENTIAL (PRIMARY) HYPERTENSION: ICD-10-CM

## 2025-01-27 RX ORDER — LOSARTAN POTASSIUM 100 MG/1
100 TABLET ORAL DAILY
Qty: 90 TABLET | Refills: 3 | Status: SHIPPED | OUTPATIENT
Start: 2025-01-27

## 2025-02-13 DIAGNOSIS — C7A.8 NEUROENDOCRINE CANCER: Primary | ICD-10-CM

## 2025-02-27 ENCOUNTER — LAB (OUTPATIENT)
Dept: LAB | Facility: HOSPITAL | Age: 60
End: 2025-02-27
Payer: COMMERCIAL

## 2025-02-27 DIAGNOSIS — C7A.8 NEUROENDOCRINE CANCER: ICD-10-CM

## 2025-02-27 LAB
ALBUMIN SERPL BCP-MCNC: 4.4 G/DL (ref 3.4–5)
ALP SERPL-CCNC: 70 U/L (ref 33–120)
ALT SERPL W P-5'-P-CCNC: 18 U/L (ref 10–52)
ANION GAP SERPL CALC-SCNC: 11 MMOL/L (ref 10–20)
AST SERPL W P-5'-P-CCNC: 18 U/L (ref 9–39)
BASOPHILS # BLD AUTO: 0.04 X10*3/UL (ref 0–0.1)
BASOPHILS NFR BLD AUTO: 0.6 %
BILIRUB SERPL-MCNC: 1 MG/DL (ref 0–1.2)
BUN SERPL-MCNC: 13 MG/DL (ref 6–23)
CALCIUM SERPL-MCNC: 9.6 MG/DL (ref 8.6–10.3)
CHLORIDE SERPL-SCNC: 104 MMOL/L (ref 98–107)
CO2 SERPL-SCNC: 31 MMOL/L (ref 21–32)
CREAT SERPL-MCNC: 0.98 MG/DL (ref 0.5–1.3)
EGFRCR SERPLBLD CKD-EPI 2021: 89 ML/MIN/1.73M*2
EOSINOPHIL # BLD AUTO: 0.33 X10*3/UL (ref 0–0.7)
EOSINOPHIL NFR BLD AUTO: 5 %
ERYTHROCYTE [DISTWIDTH] IN BLOOD BY AUTOMATED COUNT: 12.1 % (ref 11.5–14.5)
GLUCOSE SERPL-MCNC: 90 MG/DL (ref 74–99)
HCT VFR BLD AUTO: 42 % (ref 41–52)
HGB BLD-MCNC: 14.4 G/DL (ref 13.5–17.5)
IMM GRANULOCYTES # BLD AUTO: 0.04 X10*3/UL (ref 0–0.7)
IMM GRANULOCYTES NFR BLD AUTO: 0.6 % (ref 0–0.9)
LYMPHOCYTES # BLD AUTO: 1.5 X10*3/UL (ref 1.2–4.8)
LYMPHOCYTES NFR BLD AUTO: 22.8 %
MCH RBC QN AUTO: 30.1 PG (ref 26–34)
MCHC RBC AUTO-ENTMCNC: 34.3 G/DL (ref 32–36)
MCV RBC AUTO: 88 FL (ref 80–100)
MONOCYTES # BLD AUTO: 0.56 X10*3/UL (ref 0.1–1)
MONOCYTES NFR BLD AUTO: 8.5 %
NEUTROPHILS # BLD AUTO: 4.11 X10*3/UL (ref 1.2–7.7)
NEUTROPHILS NFR BLD AUTO: 62.5 %
NRBC BLD-RTO: 0 /100 WBCS (ref 0–0)
PLATELET # BLD AUTO: 160 X10*3/UL (ref 150–450)
POTASSIUM SERPL-SCNC: 3.6 MMOL/L (ref 3.5–5.3)
PROT SERPL-MCNC: 6.9 G/DL (ref 6.4–8.2)
RBC # BLD AUTO: 4.79 X10*6/UL (ref 4.5–5.9)
SODIUM SERPL-SCNC: 142 MMOL/L (ref 136–145)
WBC # BLD AUTO: 6.6 X10*3/UL (ref 4.4–11.3)

## 2025-02-27 PROCEDURE — 36415 COLL VENOUS BLD VENIPUNCTURE: CPT

## 2025-02-27 PROCEDURE — 80053 COMPREHEN METABOLIC PANEL: CPT

## 2025-02-27 PROCEDURE — 85025 COMPLETE CBC W/AUTO DIFF WBC: CPT

## 2025-03-04 ENCOUNTER — HOSPITAL ENCOUNTER (OUTPATIENT)
Dept: RADIOLOGY | Facility: HOSPITAL | Age: 60
Discharge: HOME | End: 2025-03-04
Payer: COMMERCIAL

## 2025-03-04 DIAGNOSIS — C7A.8 NEUROENDOCRINE CARCINOMA (MULTI): ICD-10-CM

## 2025-03-04 PROCEDURE — 2550000001 HC RX 255 CONTRASTS: Performed by: INTERNAL MEDICINE

## 2025-03-04 PROCEDURE — 74177 CT ABD & PELVIS W/CONTRAST: CPT

## 2025-03-04 RX ADMIN — IOHEXOL 75 ML: 350 INJECTION, SOLUTION INTRAVENOUS at 08:36

## 2025-03-08 DIAGNOSIS — E78.2 MIXED HYPERLIPIDEMIA: ICD-10-CM

## 2025-03-10 RX ORDER — ATORVASTATIN CALCIUM 40 MG/1
TABLET, FILM COATED ORAL
Qty: 90 TABLET | Refills: 3 | Status: SHIPPED | OUTPATIENT
Start: 2025-03-10

## 2025-03-12 ENCOUNTER — OFFICE VISIT (OUTPATIENT)
Dept: HEMATOLOGY/ONCOLOGY | Facility: HOSPITAL | Age: 60
End: 2025-03-12
Payer: COMMERCIAL

## 2025-03-12 VITALS
BODY MASS INDEX: 27.05 KG/M2 | OXYGEN SATURATION: 100 % | TEMPERATURE: 97 F | HEART RATE: 56 BPM | DIASTOLIC BLOOD PRESSURE: 76 MMHG | SYSTOLIC BLOOD PRESSURE: 125 MMHG | RESPIRATION RATE: 18 BRPM | WEIGHT: 190.04 LBS

## 2025-03-12 DIAGNOSIS — C7A.8 NEUROENDOCRINE CARCINOMA (MULTI): ICD-10-CM

## 2025-03-12 PROCEDURE — 3074F SYST BP LT 130 MM HG: CPT | Performed by: INTERNAL MEDICINE

## 2025-03-12 PROCEDURE — 3078F DIAST BP <80 MM HG: CPT | Performed by: INTERNAL MEDICINE

## 2025-03-12 PROCEDURE — 99215 OFFICE O/P EST HI 40 MIN: CPT | Performed by: INTERNAL MEDICINE

## 2025-03-12 ASSESSMENT — PAIN SCALES - GENERAL: PAINLEVEL_OUTOF10: 0-NO PAIN

## 2025-03-12 NOTE — PROGRESS NOTES
.Patient ID: Saul Ramirez is a 59 y.o. male.  Referring Physician: Adam Robles MD  [id number] [id number]  Primary Care Provider: Robbie So MD      Chief complaint : Localized SB NET    HPI  59 yr old male with PMHx of CAD, HTN, HLD who presented to the ED with abdominal pain and N/V.   CT A/P demonstrated dilated loops of small bowel proximal to a 1.5 x 1.7 cm mass in the jejunum.  NGT placed for decompression.  Taken to the OR 7/17 and underwent ex lap, laparotomy, SBR with Dr Galicia.  Post-op course uncomplicated. Path showed T4N0M0 G2 well diff. NET (Ki67 3.8%). Pt used to have symptoms that was described as IBS for many years with intermittent diarrhea.   Today: He denies abdominal pain, no nausea or vomiting and no more diarrhea.         SYSTEMIC TREATMENT RECEIVED       Subjective   Please refer to Notes above for complete History of present illness.          Review of Systems:   All 14 systems have been reviewed and were negative except for the HPI.       MEDICAL HISTORY  Past Medical History:   Diagnosis Date    Personal history of other malignant neoplasm of skin     History of squamous cell carcinoma of skin       FAMILY HISTORY  Family History   Problem Relation Name Age of Onset    Pancreatic cancer Mother      Coronary artery disease Father         TOBACCO HISTORY  Tobacco Use: Low Risk  (11/22/2024)    Patient History     Smoking Tobacco Use: Never     Smokeless Tobacco Use: Never     Passive Exposure: Not on file       SOCIAL HISTORY  Social Connections: Not on file        Outpatient Medication Profile:  Current Outpatient Medications on File Prior to Visit   Medication Sig Dispense Refill    aspirin 81 mg EC tablet Take 1 tablet (81 mg) by mouth once daily.      atorvastatin (Lipitor) 40 mg tablet TAKE 1 TABLET BY MOUTH EVERY DAY 90 tablet 3    cholecalciferol (Vitamin D-3) 25 MCG (1000 UT) tablet Take 1 tablet (1,000 Units) by mouth once daily.      hydroCHLOROthiazide  (Microzide) 12.5 mg tablet TAKE 1 TABLET BY MOUTH EVERY DAY 90 tablet 3    losartan (Cozaar) 100 mg tablet TAKE 1 TABLET BY MOUTH EVERY DAY 90 tablet 3    multivitamin tablet Take 1 tablet by mouth once daily.      sildenafil (Viagra) 100 mg tablet TAKE 1 TABLET DAILY AS NEEDED APPROXIMATELY 1 HOUR BEFORE SEXUAL ACTIVITY 6 tablet 11    [DISCONTINUED] atorvastatin (Lipitor) 40 mg tablet TAKE 1 TABLET BY MOUTH EVERY DAY 90 tablet 1     No current facility-administered medications on file prior to visit.         Performance Status:  Asymptomatic     Vitals and Measurements:   /76 (BP Location: Left arm, Patient Position: Sitting, BP Cuff Size: Adult)   Pulse 56   Temp 36.1 °C (97 °F) (Temporal)   Resp 18   Wt 86.2 kg (190 lb 0.6 oz)   SpO2 100%   BMI 27.05 kg/m²       Physical Exam:   General: Appears well and in NAD  Eyes: PERRL, EOMI, clear sclera  ENMT: mucous membranes moist, no apparent injury, no lesions seen  Head/Neck: Neck supple, no apparent injury, thyroid without mass or tenderness, No JVD, trachea midline,   Thorax: Patent airways, CTAB, normal breath sounds with good chest expansion, thorax symmetric  Cardiovascular: Regular, rate and rhythm, no murmurs, 2+ equal pulses of the extremities, normal S 1and S 2  Gastrointestinal: Nondistended, soft, non-tender, no rebound tenderness or guarding, no masses palpable, no organomegaly, +BS  Musculoskeletal: ROM intact, no joint swelling, normal strength  Extremities: normal extremities, no cyanosis edema, contusions or wounds, no clubbing  Neurological: alert and oriented x3, intact senses, motor, response and reflexes, normal strength  Lymphatic: No significant lymphadenopathy  Psychological: Appropriate mood and behavior  Skin: Warm and dry, no lesions, no rashes         Lab Results:  I have reviewed these laboratory results:     Lab Results   Component Value Date    WBC 6.6 02/27/2025    HGB 14.4 02/27/2025    HCT 42.0 02/27/2025    MCV 88  02/27/2025     02/27/2025         Chemistry    Lab Results   Component Value Date/Time     02/27/2025 1554    K 3.6 02/27/2025 1554     02/27/2025 1554    CO2 31 02/27/2025 1554    BUN 13 02/27/2025 1554    CREATININE 0.98 02/27/2025 1554    Lab Results   Component Value Date/Time    CALCIUM 9.6 02/27/2025 1554    ALKPHOS 70 02/27/2025 1554    AST 18 02/27/2025 1554    ALT 18 02/27/2025 1554    BILITOT 1.0 02/27/2025 1554            Lab Results   Component Value Date    TSH 3.23 11/22/2024        Radiology Result:  I have reviewed the latest Imaging in PACS and the findings are noted in this note. I discussed the results of the latest imaging with the patient. All previous imaging were reviewed at the time it was completed. Full records are available in the EMR for review as well.     === 07/14/24 ===    CT CHEST WO IV CONTRAST    - Impression -  1.  No evidence of thoracic malignancy/metastasis.  2. No evidence acute cardiopulmonary process.    I personally reviewed the images/study and I agree with the findings  as stated by Dr. Taz Koehler. This study was interpreted at  [place], [place].    Signed by: René Yen 7/15/2024 3:19 PM  Dictation workstation:   [id number]      Pathology Results:  I have reviewed the full pathology report recorded in the EMR. The pertinent portions indicating diagnosis are listed here in the note. for details please refer to the full report recorded in the EMR.    Assessment and Plan:     Localized SB NET (G2, Ki67 3.8%)    59 yr old male with PMHx of CAD, HTN, HLD who presented to the ED with abdominal pain and N/V.   CT A/P demonstrated dilated loops of small bowel proximal to a 1.5 x 1.7 cm mass in the jejunum.  NGT placed for decompression.  Taken to the OR 7/17 and underwent ex lap, laparotomy, SBR with Dr Galicia.  Post-op course uncomplicated. Path showed T4N0M0 G2 well diff. NET (Ki67 3.8%). PET-Ga68 showed  only update in uncinate process which is mostly false positive. Pt used to have symptoms that was described as IBS for many years with intermittent diarrhea.   5HIAA was normal 7.0 ng/ml  MRI abdomen: No suspicious pancreatic lesions and there is only liver hemangioma and reactive portocaval LN  03/04/2025: Restaging CT excluded recurrent disease   Today: He denies abdominal pain, no nausea or vomiting and no more diarrhea.     Plan:  - Will resume with CT AP in 12 months for annual surveillance.        DISCLAIMER:   In preparing for this visit and writing this note, I reviewed all the previous electronic medical records (labs, imaging and medical charts) of the patient available in the physician portal. Significant findings which helped in decision making are recorded  in this chart.     The plan was discussed with the patient. We gave him ample opportunities to ask questions. All questions were answered to his satisfaction and he verbalized understanding.       Adam Robles M.D.   and Director of the Neuroendocrine Tumor Program  Gastrointestinal Medical Oncology  Department of Hematology and Oncology  [facility], [place] [address]  Phone (Office): [phone]  Fax:  [phone]   Email: [email or web address]  Learn more about [facility] Comprehensive Neuroendocrine Tumor Program: Click Here  Learn more about [place] [employer]: [email or web address]

## 2025-07-25 DIAGNOSIS — F52.21 MALE ERECTILE DISORDER (CODE): ICD-10-CM

## 2025-07-28 RX ORDER — SILDENAFIL 100 MG/1
TABLET, FILM COATED ORAL
Qty: 6 TABLET | Refills: 11 | Status: SHIPPED | OUTPATIENT
Start: 2025-07-28

## 2025-11-07 ENCOUNTER — APPOINTMENT (OUTPATIENT)
Dept: PRIMARY CARE | Facility: CLINIC | Age: 60
End: 2025-11-07
Payer: COMMERCIAL

## (undated) DEVICE — SUTURE, MONOCRYL, 4-0, 18 IN, PS2, UNDYED

## (undated) DEVICE — TOWEL, SURGICAL, NEURO, O/R, 16 X 26, BLUE, STERILE

## (undated) DEVICE — SUTURE, V-LOC, 3-0, 9IN, CV-23, GREEN

## (undated) DEVICE — Device

## (undated) DEVICE — RELOAD, ECHELON 60MM, GREY

## (undated) DEVICE — APPLICATOR, CHLORAPREP, W/ORANGE TINT, 26ML

## (undated) DEVICE — SUTURE, VICRYL PLUS 3-0, SH, 27IN

## (undated) DEVICE — SPONGE, LAP, XRAY DECT, 18IN X 18IN, W/MASTER DMT, STERILE

## (undated) DEVICE — TROCAR SYSTEM, BALLOON, KII GELPORT, 12 X 100MM

## (undated) DEVICE — DRAPE, INCISE, ANTIMICROBIAL, IOBAN 2, LARGE, 17 X 23 IN, DISPOSABLE, STERILE

## (undated) DEVICE — ELECTRODE, LAPAROSCOPIC OPT12, LF

## (undated) DEVICE — DRAPE, SHEET, ENDOSCOPY, GENERAL, FENESTRATED, ARMBOARD COVER, 98 X 123.5 IN, DISPOSABLE, LF, STERILE

## (undated) DEVICE — SUTURE, VICRYL, 4-0, 18 IN, UNDYED BR PS-2

## (undated) DEVICE — DEVICE, HAND ACCESS, GELPORT, 120MM

## (undated) DEVICE — SUTURE, VICRYL, 0, 27 IN, UR-6, VIOLET

## (undated) DEVICE — PAD, GROUNDING, ELECTROSURGICAL, DUAL

## (undated) DEVICE — STRIP, SKIN CLOSURE, STERI STRIP, REINFORCED, 0.5 X 4 IN

## (undated) DEVICE — STAPLER, ECHELON 3000, 60MM STD

## (undated) DEVICE — TROCAR, KII OPTICAL BLADELESS 5MM Z THREAD 100MM LNGTH

## (undated) DEVICE — ADHESIVE, SKIN, LIQUIBAND EXCEED

## (undated) DEVICE — TUBE, SALEM SUMP, 16 FR X 48IN, ENFIT

## (undated) DEVICE — COVER, CART, 45 X 27 X 48 IN, CLEAR

## (undated) DEVICE — SUTURE, STRATAFIX, 1 SYMMETRIC, PDS PLUS, 60CM, CTX, VIOLET

## (undated) DEVICE — STAPLER,  LINEAR ENDO 60MM RELOAD, BLACK, DISP

## (undated) DEVICE — MANIFOLD, 4 PORT NEPTUNE STANDARD

## (undated) DEVICE — SUTURE, V-LOC 3-0 CV-23 6 GR 180 ABS"

## (undated) DEVICE — STAPLER,  LINEAR RELOAD, 60MM, WHITE, DISP

## (undated) DEVICE — GOWN, SURGICAL, SMARTGOWN, XLARGE, STERILE

## (undated) DEVICE — SUTURE, SILK, 3-0, 30 IN, SH, CONTROL RELEASE, MULTIPACK, BLACK